# Patient Record
Sex: FEMALE | Race: BLACK OR AFRICAN AMERICAN | NOT HISPANIC OR LATINO | Employment: FULL TIME
[De-identification: names, ages, dates, MRNs, and addresses within clinical notes are randomized per-mention and may not be internally consistent; named-entity substitution may affect disease eponyms.]

---

## 2018-07-03 ENCOUNTER — HEALTH MAINTENANCE LETTER (OUTPATIENT)
Age: 28
End: 2018-07-03

## 2018-07-26 ENCOUNTER — OFFICE VISIT (OUTPATIENT)
Dept: OBGYN | Facility: CLINIC | Age: 28
End: 2018-07-26
Attending: STUDENT IN AN ORGANIZED HEALTH CARE EDUCATION/TRAINING PROGRAM
Payer: COMMERCIAL

## 2018-07-26 VITALS — WEIGHT: 107.4 LBS

## 2018-07-26 DIAGNOSIS — Z01.818 TUBAL LIGATION EVALUATION: Primary | ICD-10-CM

## 2018-07-26 RX ORDER — KETOROLAC TROMETHAMINE 30 MG/ML
30 INJECTION, SOLUTION INTRAMUSCULAR; INTRAVENOUS ONCE
Status: CANCELLED | OUTPATIENT
Start: 2018-07-26 | End: 2018-07-26

## 2018-07-26 NOTE — PROGRESS NOTES
Gyn Progress Note    S: Ms Stafford is a 27yo  who desires tubal ligation.     She has had four children in the past. Last was born in 2014. One of her children has severe autism.  She does not desire any more children.     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 Lives in [place] with her family and husbands parents.  Drinks EtOH socially. No drug use. Sexually activ ewith , using condoms.     O:  Wt 48.7 kg (107 lb 6.4 oz)  LMP 2018 (Exact Date)     Gen: NAD  CV: RRR  Resp: CTAB  Abd: soft, non-tender, non-distended  Ext: No swelling, tenderness      A/P:   Desires permanent sterilization  Discussed her desire for permanent sterilization. Discussed risk of regret, failure, and ectopic pregnancy and discussed options of nonpermanent sterilization including Paragard, Mirena, Nexplanon.  Discussed vasectomy as alternate option. She continues to desire permanent sterilization.  - Signed Federal tubal papers today  - Orders for tubal ligation, schedule 30d from today      Marcy Ivory MD  PGY-1  Ob/Gyn    I have seen and examined the patient with the resident. I have reviewed, edited, and agree with the note.     Shadia Kathleen MD

## 2018-07-26 NOTE — Clinical Note
2018       RE: Marita Stafford  [address]  [facility] [address]     Dear Colleague,    Thank you for referring your patient, Marita Stafford, to [facility] Please see a copy of my visit note below.    Gyn Progress Note    S: Ms Stafford is a 27yo  who desires tubal ligation.     She has had four children in the past. Last was born in 2014. One of her children has severe autism.  She does not desire any more children.     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 Lives in [place] with her family and husbands parents.  Drinks EtOH socially. No drug use. Sexually activ ewith , using condoms.     O:  Wt 48.7 kg (107 lb 6.4 oz)  LMP 2018 (Exact Date)     Gen: NAD  CV: RRR  Resp: CTAB  Abd: soft, non-tender, non-distended  Ext: No swelling, tenderness      A/P:   Desires permanent sterilization  Discussed her desire for permanent sterilization. Discussed risk of regret, failure, and ectopic pregnancy and discussed options of nonpermanent sterilization including Paragard, Mirena, Nexplanon.  Discussed vasectomy as alternate option. She continues to desire permanent sterilization.  - Signed Federal tubal papers today  - Orders for tubal ligation, schedule 30d from today      Marcy Ivory MD  PGY-1  Ob/Gyn        Again, thank you for allowing me to participate in the care of your patient.      Sincerely,    Marcy Ivory MD

## 2018-07-26 NOTE — LETTER
Date:August 6, 2018      Patient was self referred, no letter generated. Do not send.        [facility] Information

## 2018-07-26 NOTE — MR AVS SNAPSHOT
After Visit Summary   7/26/2018    Marita Stafford    MRN: [id number]           Patient Information     Date Of Birth          1990        Visit Information        Provider Department      7/26/2018 2:15 PM Marcy Ivory MD [facility]        Today's Diagnoses     Tubal ligation evaluation    -  1       Follow-ups after your visit        Your next 10 appointments already scheduled     Sep 14, 2018   Procedure with Surekha Hutchins MD   [facility], [place], Same Day Surgery (--)    [address]  [facility] [id number] [phone]              Who to contact     Please call your clinic at [phone] to:    Ask questions about your health    Make or cancel appointments    Discuss your medicines    Learn about your test results    Speak to your doctor            Additional Information About Your Visit        MyChart Information     [employer] gives you secure access to your electronic health record. If you see a primary care provider, you can also send messages to your care team and make appointments. If you have questions, please call your primary care clinic.  If you do not have a primary care provider, please call [phone] and they will assist you.      [employer] is an electronic gateway that provides easy, online access to your medical records. With [employer], you can request a clinic appointment, read your test results, renew a prescription or communicate with your care team.     To access your existing account, please contact your [facility] or call [phone] for assistance.        Care EveryWhere ID     This is your Care EveryWhere ID. This could be used by other organizations to access your [place] medical records  [id number]        Your Vitals Were     Last Period                   07/20/2018 (Exact Date)            Blood Pressure from Last 3 Encounters:   No data found for BP    Weight from Last 3  Encounters:   No data found for Wt              Today, you had the following     No orders found for display       Primary Care Provider Fax #    Physician No Ref-Primary [phone]       No address on file        Equal Access to Services     SHAWN CANALES : Hadii aad ku hadtru Caro, jas marshafroylan, diamond quintero, zackary mayorga laanabellaclemente lloyd. So [facility] [phone].    ATENCIÓN: Si habla español, tiene a larson disposición servicios gratuitos de asistencia lingüística. Llame al [phone].    We comply with applicable federal civil rights laws and [place] laws. We do not discriminate on the basis of race, color, national origin, age, disability, sex, sexual orientation, or gender identity.            Thank you!     Thank you for choosing [facility]  for your care. Our goal is always to provide you with excellent care. Hearing back from our patients is one way we can continue to improve our services. Please take a few minutes to complete the written survey that you may receive in the mail after your visit with us. Thank you!             Your Updated Medication List - Protect others around you: Learn how to safely use, store and throw away your medicines at www.disposemymeds.org.      Notice  As of 7/26/2018 11:59 PM    You have not been prescribed any medications.

## 2018-07-27 ENCOUNTER — TELEPHONE (OUTPATIENT)
Dept: OBGYN | Facility: CLINIC | Age: 28
End: 2018-07-27

## 2018-07-27 NOTE — TELEPHONE ENCOUNTER
Patient in clinic and received surgery packet, 9/14/18 with arrival time at 9:00a.m with nothing to eat eight hours before scheduled surgery time and clear liquids up to two hours before scheduled surgery time.     to complete the following fields:            CHECKLIST     Google Calendar : Yes     Resident notified: Not Applicable     Clinic schedule blocked:  Not Applicable    Patient notified:Yes      Pre op information sent: Yes     Given to patient over the phone.Yes    Comments:

## 2018-08-20 ENCOUNTER — TRANSFERRED RECORDS (OUTPATIENT)
Dept: HEALTH INFORMATION MANAGEMENT | Facility: CLINIC | Age: 28
End: 2018-08-20

## 2018-08-20 ENCOUNTER — OFFICE VISIT (OUTPATIENT)
Dept: FAMILY MEDICINE | Facility: OTHER | Age: 28
End: 2018-08-20
Attending: PHYSICIAN ASSISTANT
Payer: COMMERCIAL

## 2018-08-20 VITALS
BODY MASS INDEX: 21.25 KG/M2 | SYSTOLIC BLOOD PRESSURE: 88 MMHG | WEIGHT: 105.4 LBS | HEART RATE: 99 BPM | HEIGHT: 59 IN | DIASTOLIC BLOOD PRESSURE: 60 MMHG | OXYGEN SATURATION: 99 %

## 2018-08-20 DIAGNOSIS — Z30.09 BIRTH CONTROL COUNSELING: ICD-10-CM

## 2018-08-20 DIAGNOSIS — Z01.818 PRE-OP EXAM: Primary | ICD-10-CM

## 2018-08-20 DIAGNOSIS — K42.9 UMBILICAL HERNIA WITHOUT OBSTRUCTION AND WITHOUT GANGRENE: ICD-10-CM

## 2018-08-20 LAB
HCG UR QL: NEGATIVE
HGB BLD-MCNC: 12.2 G/DL (ref 11.7–15.7)

## 2018-08-20 PROCEDURE — 36415 COLL VENOUS BLD VENIPUNCTURE: CPT | Performed by: PHYSICIAN ASSISTANT

## 2018-08-20 PROCEDURE — 85018 HEMOGLOBIN: CPT | Performed by: PHYSICIAN ASSISTANT

## 2018-08-20 PROCEDURE — G0463 HOSPITAL OUTPT CLINIC VISIT: HCPCS

## 2018-08-20 PROCEDURE — 99214 OFFICE O/P EST MOD 30 MIN: CPT | Performed by: PHYSICIAN ASSISTANT

## 2018-08-20 PROCEDURE — 81025 URINE PREGNANCY TEST: CPT | Performed by: PHYSICIAN ASSISTANT

## 2018-08-20 NOTE — NURSING NOTE
"Date of Surgery: 9/14/18  Type of Surgery: Laparoscopic tubal ligation  Surgeon: Dr. Surekha Varela  Hospital:  [facility]  Fax: [phone]    Fever/Chills or other infectious symptoms in past month: NO  >10lb weight loss in past two months: NO  O2 SAT: 99    Health Care Directive/Code status:  NO  Hx of blood transfusions:   NO   Td up to date:  Yes  History of VRE/MRSA:  NO Date:    Preoperative Evaluation: Obstructive Sleep Apnea screening    S: Snore -  Do you snore loudly? (louder than talking or loud enough to be heard through closed doors) NO  T: Tired - Do you often feel tired, fatigued, or sleepy during the daytime?NO  O: Observed - Has anyone ever observed you stop breathing during your sleep?NO  P: Pressure - Do you have or are you being treated for high blood pressure?NO  B: BMI - BMI greater than 35kg/m2?NO  A: Age - Age over 50 years old?NO  N: Neck - Neck circumference greater than 40 cm?NO  G: Gender - Gender: Male?NO    Total number of \"YES\" responses:  0    Scoring: Low risk of ALBIN 0-2  At Risk of ALBIN: >3 High Risk of ALBIN: 5-8    Rufina Paredes LPN ...... 8/20/2018 3:06 PM          "

## 2018-08-20 NOTE — MR AVS SNAPSHOT
After Visit Summary   8/20/2018    Marita Stafford    MRN: [id number]           Patient Information     Date Of Birth          1990        Visit Information        Provider Department      8/20/2018 3:00 PM Agnieszka Pritchett PA-C [facility]        Today's Diagnoses     Pre-op exam    -  1      Care Instructions    Patient should take the following medications the morning of surgery with a small sip of water: hold all meds.  Patient was instructed to hold the following medications the morning of surgery: hold all meds.     Patient was advised to call our office and the surgical services with any change in condition or new symptoms if they were to develop between today and their surgical date.  Especially any cardiopulmonary symptoms or symptoms concerning for an infection.     Discontinue aspirin, aleve, naproxen and ibuprofen 7 days prior to surgery to reduce bleeding risk.  It is fine to take tylenol the week before surgery.  Hold vitamins and herbal remedies for 7 days before surgery.            Follow-ups after your visit        Follow-up notes from your care team     Return if symptoms worsen or fail to improve.      Your next 10 appointments already scheduled     Sep 14, 2018   Procedure with Surekha Hutchins MD   [facility], [place], Same Day Surgery (--)    [address] [id number] [phone]              Future tests that were ordered for you today     Open Future Orders        Priority Expected Expires Ordered    Hemoglobin Routine  8/20/2019 8/20/2018            Who to contact     If you have questions or need follow up information about today's clinic visit or your schedule please contact [facility] hospitals directly at [phone].  Normal or non-critical lab and imaging results will be communicated to you by MyChart, letter or phone within 4 business days after the clinic has received the results. If you do  "not hear from us within 7 days, please contact the clinic through [employer] or phone. If you have a critical or abnormal lab result, we will notify you by phone as soon as possible.  Submit refill requests through [employer] or call your pharmacy and they will forward the refill request to us. Please allow 3 business days for your refill to be completed.          Additional Information About Your Visit        Notorious[employer] Information     [employer] gives you secure access to your electronic health record. If you see a primary care provider, you can also send messages to your care team and make appointments. If you have questions, please call your primary care clinic.  If you do not have a primary care provider, please call [phone] and they will assist you.        Care EveryWhere ID     This is your Care EveryWhere ID. This could be used by other organizations to access your [place] medical records  [id number]        Your Vitals Were     Pulse Height Last Period Pulse Oximetry BMI (Body Mass Index)       99 4' 11\" (1.499 m) 08/19/2018 99% 21.29 kg/m2        Blood Pressure from Last 3 Encounters:   08/20/18 (!) 88/60    Weight from Last 3 Encounters:   08/20/18 105 lb 6.4 oz (47.8 kg)   07/26/18 107 lb 6.4 oz (48.7 kg)              We Performed the Following     Pregnancy, Urine (HCG)        Primary Care Provider Fax #    Physician No Ref-Primary [phone]       No address on file        Equal Access to Services     [facility]: Hadii dannie kelley hadasho Sodeannali, waaxda luqadaha, qaybta kaalmada adeshailada, zackary love . So [facility] [phone].    ATENCIÓN: Si habla español, tiene a larson disposición servicios gratuitos de asistencia lingüística. Llame al [phone].    We comply with applicable federal civil rights laws and [place] laws. We do not discriminate on the basis of race, color, national origin, age, disability, sex, sexual orientation, or gender identity.            Thank you!     " Thank you for choosing [facility]  for your care. Our goal is always to provide you with excellent care. Hearing back from our patients is one way we can continue to improve our services. Please take a few minutes to complete the written survey that you may receive in the mail after your visit with us. Thank you!             Your Updated Medication List - Protect others around you: Learn how to safely use, store and throw away your medicines at www.disposemymeds.org.      Notice  As of 8/20/2018  3:29 PM    You have not been prescribed any medications.

## 2018-08-20 NOTE — LETTER
Marita Stafford  [address] [place] [address]    8/21/2018      Dear Ms. Stafford,      We've received the results back from the laboratory for the samples you gave in clinic.  Your labs are normal. Please contact us at [phone] with any questions or concerns that you have.    I attached your lab results for your records.        Take Care,         Agnieszka Pritchett PA-C    Resulted Orders   Pregnancy, Urine (HCG)   Result Value Ref Range    HCG Qual Urine Negative NEG^Negative      Comment:      This test is for screening purposes.  Results should be interpreted along with   the clinical picture.  Confirmation testing is available if warranted by   ordering UUR821, HCG Quantitative Pregnancy.     Hemoglobin   Result Value Ref Range    Hemoglobin 12.2 11.7 - 15.7 g/dL

## 2018-08-20 NOTE — PATIENT INSTRUCTIONS
Patient should take the following medications the morning of surgery with a small sip of water: hold all meds.  Patient was instructed to hold the following medications the morning of surgery: hold all meds.     Patient was advised to call our office and the surgical services with any change in condition or new symptoms if they were to develop between today and their surgical date.  Especially any cardiopulmonary symptoms or symptoms concerning for an infection.     Discontinue aspirin, aleve, naproxen and ibuprofen 7 days prior to surgery to reduce bleeding risk.  It is fine to take tylenol the week before surgery.  Hold vitamins and herbal remedies for 7 days before surgery.

## 2018-08-20 NOTE — PROGRESS NOTES
----------------- PREOPERATIVE EXAM ------------------  8/20/2018    SUBJECTIVE:  Marita Stafford is a 28 year old female here for preoperative optimization.    I was asked to see Marita Stafford by Dr. Surekha Varela for a preoperative optimization due to general health and anesthesia risk.     Patient has a history of wanting to have a tubal ligation.  Currently has 4 children.  No interest in having children in the future.  Scheduled to have a laparoscopic tubal ligation on 9/14/2018.    Patient has history of having an umbilical hernia.  The hernia comes and goes.  Initially presented with 2nd pregnancy around 2002.  The umbilical hernia sometimes causes her discomfort and irritation.  Last caused irritation 3 weeks ago.  The hernia is sensitive when it flairs. Last flaired for 1-2 days.  No current symptoms.  It measures approximately 1-2 cm in diameter when it flares.  No current bowel or bladder concerns.  No blood in her stool or urine.    Patient is otherwise healthy.  No recent cough or cold symptoms.  No fevers, chills, headaches, nausea, vomiting, chest pain, palpitations, problems breathing, stomachaches, diarrhea, constipation, blood in stool or urine, dysuria, frequency, urgency.  No swelling of her hands or feet.    Nursing Notes:   Ryann Paredes LPN  8/20/2018  3:21 PM  Addendum  Date of Surgery: 9/14/18  Type of Surgery: Laparoscopic tubal ligation  Surgeon: Dr. Surekha Varela  Hospital[facility]  [facility]  Fax: [phone]    Fever/Chills or other infectious symptoms in past month: NO  >10lb weight loss in past two months: NO  O2 SAT: 99    Health Care Directive/Code status:  NO  Hx of blood transfusions:   NO   Td up to date:  Yes  History of VRE/MRSA:  NO Date:    Preoperative Evaluation: Obstructive Sleep Apnea screening    S: Snore -  Do you snore loudly? (louder than talking or loud enough to be heard through closed doors) NO  T: Tired - Do  "you often feel tired, fatigued, or sleepy during the daytime?NO  O: Observed - Has anyone ever observed you stop breathing during your sleep?NO  P: Pressure - Do you have or are you being treated for high blood pressure?NO  B: BMI - BMI greater than 35kg/m2?NO  A: Age - Age over 50 years old?NO  N: Neck - Neck circumference greater than 40 cm?NO  G: Gender - Gender: Male?NO    Total number of \"YES\" responses:  0    Scoring: Low risk of ALBIN 0-2  At Risk of ALBIN: >3 High Risk of ALBIN: 5-8    Rufinamaryjo Veraher SORIANO ...... 8/20/2018 3:06 PM            There are no active problems to display for this patient.      Past Medical History:   Diagnosis Date     Sinus tachycardia 02/13/2012    occurred with dehydration during pregnancy. Normal EKG since then       Past Surgical History:   Procedure Laterality Date     HC TOOTH EXTRACTION W/FORCEP         No current outpatient prescriptions on file.       Recent use of ibuprofen, aspirin or aleve: Yes - hold 7 days prior to surgery    Allergies:  No Known Allergies    Latex allergy  No    Family History   Problem Relation Age of Onset     Adopted: Yes     No Known Problems Mother      No Known Problems Father      No Known Problems Brother        Denies family hx of bleeding tendencies, anesthesia complications, or other problems with surgery.      Social History     Social History     Marital status:      Spouse name: N/A     Number of children: N/A     Years of education: N/A     Occupational History     Not on file.     Social History Main Topics     Smoking status: Former Smoker     Packs/day: 0.25     Years: 1.00     Types: Cigarettes     Start date: 1/17/2008     Quit date: 12/17/2008     Smokeless tobacco: Never Used     Alcohol use Yes      Comment: occ     Drug use: No     Sexual activity: Yes     Partners: Male     Other Topics Concern     Not on file     Social History Narrative         ROS:    surgical:  patient denies previous complications from prior surgeries " "including but not limited to prolonged bleeding, anesthesia complications, dysrhythmias, surgical wound infections, or prolonged hospital stay.      REVIEW OF SYSTEMS:  A comprehensive review of systems was negative except foritems noted in HPI/Subjective.    Constitutional: Negative for fever, chills and fatigue .   Eyes: Negative for irritation  and redness .  Ears, nose, mouth, throat, and face: Negative for ear drainage, nasal congestion, sore throat and hoarseness .  Respiratory: Negative for cough, sputum production , hemoptysis, dyspnea  and wheezing .  Cardiovascular: Negative for chest discomfort, palpitations and lightheadedness .  Gastrointestinal: Negative for dysphagia, nausea, vomiting, melena, diarrhea, constipation and abdominal pain.  Genitourinary: Negative for frequency, dysuria, urinary incontinence, hematuria.  Integument/breast: Negative for rash.   Hematologic/lymphatic: Negative for easy bruising, bleeding, lymphadenopathy and petechiae.   Musculoskeletal: Negative for myalgias and arthralgias .  Neurological: Negative for headaches, dizziness, vertigo, seizures and weakness.   Psychiatric: Negative for anxiety, depression, panic attacks and suicidal ideations.       -------------------------------------------------------------    PHYSICAL EXAM:  BP (!) 88/60 (BP Location: Right arm, Patient Position: Sitting, Cuff Size: Adult Regular)  Pulse 99  Ht 4' 11\" (1.499 m)  Wt 105 lb 6.4 oz (47.8 kg)  LMP 08/19/2018  SpO2 99%  BMI 21.29 kg/m2    EXAM:  General Appearance: Pleasant, alert, appropriate appearance for age. No acute distress  Head Exam: Normal. Normocephalic, atraumatic.  Eye Exam: Normal external eye, conjunctiva, lids, cornea. CLEO.  Ear Exam: Normal TM's bilaterally. Normal auditory canals and external ears. Non-tender.  Nose Exam: Normal external nose, mucus membranes, and septum.  OroPharynx Exam: Dental hygiene adequate. Normal buccal mucosa. Normal pharynx.  Neck Exam: " Supple, no masses or nodes., no lymphadenopathy  Thyroid Exam: Normal., No nodules or enlargement., normal to palpation  Chest/Respiratory Exam: Normal chest wall and respirations. Clear to auscultation.  Cardiovascular Exam: Regular rate and rhythm. S1, S2, no murmur, click, gallop, or rubs.  Gastrointestinal Exam: Soft, nontender, no abnormal masses or organomegaly. BS x 4.  Small umbilical hernia appreciated approximately 1 cm in diameter with Valsalva.  Lymphatic Exam: Normal.  Musculoskeletal Exam: Back is straight and non-tender, full ROM of upper and lower extremities.  Skin: no rash or abnormalities  Neurologic Exam: symmetric DTRs, normal gross motor movement, tone, and coordination. No tremor.  Psychiatric Exam: Alert and oriented, appropriate affect.    PHQ Depression Screen  No flowsheet data found.    Patient can walk up a flight of stairs without becoming short of breath or having chest pain: YES   Patient is able to tolerate greater than 4 METs of activity without any cardiopulmonary symptoms.    LABS:    Results for orders placed or performed in visit on 08/20/18   Pregnancy, Urine (HCG)   Result Value Ref Range    HCG Qual Urine Negative NEG^Negative   Hemoglobin   Result Value Ref Range    Hemoglobin 12.2 11.7 - 15.7 g/dL          CXR:  Not necessary    EKG: Not necessary    ---------------------------------------------------------------    No family history of problems with bleeding or anesthetia.    ASA PS class 2. Patient's perioperative risk is minimized and no further cardiopulmonary workup is neccesary.  Please contact the office with any questions or concerns.    1. Pre-op exam    2. Birth control counseling    3. Umbilical hernia without obstruction and without gangrene          ASSESSEMNT AND PLAN:  1.  Preoperative history and physical   consults:  None  Patient is approved for the surgery.  No concerns.     For above listed surgery and anesthesia:    - Patient is Low risk for  perioperative complications.    Patient was administered the following vaccines today: none.  Completed labs today: hemoglobin, UPT, no concerns.    Patient does have history of a small umbilical hernia the last few years.  Encouraged patient to discuss these symptoms with her surgeon to ensure that there are no concerns during the surgery.    PRE OP RECOMMENDATIONS:  Patient is on chronic pain medications no   Patient is on antiplatlet/anticoagulation no   Other medications that need adjustment perioperatively no     Patient Instructions   Patient should take the following medications the morning of surgery with a small sip of water: hold all meds.  Patient was instructed to hold the following medications the morning of surgery: hold all meds.     Patient was advised to call our office and the surgical services with any change in condition or new symptoms if they were to develop between today and their surgical date.  Especially any cardiopulmonary symptoms or symptoms concerning for an infection.     Discontinue aspirin, aleve, naproxen and ibuprofen 7 days prior to surgery to reduce bleeding risk.  It is fine to take tylenol the week before surgery.  Hold vitamins and herbal remedies for 7 days before surgery.        Other:  Patient was advised to call our office and the surgical services with any change in condition or new symptoms if they were to develop between today and their surgical date.  Especially any cardiopulmonary symptoms or symptoms concerning for an infection.     PRE OP RECOMMENDATIONS:  Discontinue ASA 7 days prior to reduce bleeding risk and Discontinue NSAIDS 7 days prior to procedure to reduce bleeding risk    Greater than 25 minutes were spent in counseling and coordination of care.    Agnieszka Pritchett PA-C..................8/20/2018 3:21 PM

## 2018-09-09 ENCOUNTER — ANESTHESIA EVENT (OUTPATIENT)
Dept: SURGERY | Facility: CLINIC | Age: 28
End: 2018-09-09
Payer: COMMERCIAL

## 2018-09-13 ENCOUNTER — TELEPHONE (OUTPATIENT)
Dept: OBGYN | Facility: CLINIC | Age: 28
End: 2018-09-13

## 2018-09-13 NOTE — TELEPHONE ENCOUNTER
Called and LVM regarding options for patient's surgery tomorrow: proceed with l/s tubal and no umbilical hernia repair (Dr. Jang not available) or if she desires hernia repair and tubal to be completed at the same time, we would reschedule her surgery for tomorrow.  Asked Marita to call back today with her decision.  MD Jyotsna

## 2018-09-14 ENCOUNTER — ANESTHESIA (OUTPATIENT)
Dept: SURGERY | Facility: CLINIC | Age: 28
End: 2018-09-14
Payer: COMMERCIAL

## 2018-09-14 ENCOUNTER — HOSPITAL ENCOUNTER (OUTPATIENT)
Facility: CLINIC | Age: 28
Discharge: HOME | End: 2018-09-14
Attending: OBSTETRICS & GYNECOLOGY | Admitting: OBSTETRICS & GYNECOLOGY
Payer: COMMERCIAL

## 2018-09-14 ENCOUNTER — SURGERY (OUTPATIENT)
Age: 28
End: 2018-09-14

## 2018-09-14 VITALS
TEMPERATURE: 98.2 F | WEIGHT: 104.72 LBS | SYSTOLIC BLOOD PRESSURE: 95 MMHG | HEIGHT: 59 IN | DIASTOLIC BLOOD PRESSURE: 62 MMHG | RESPIRATION RATE: 12 BRPM | BODY MASS INDEX: 21.11 KG/M2 | OXYGEN SATURATION: 99 %

## 2018-09-14 DIAGNOSIS — G89.18 POSTOPERATIVE PAIN: Primary | ICD-10-CM

## 2018-09-14 LAB
ABO + RH BLD: NORMAL
ABO + RH BLD: NORMAL
B-HCG SERPL-ACNC: <1 IU/L (ref 0–5)
BLD GP AB SCN SERPL QL: NORMAL
BLOOD BANK CMNT PATIENT-IMP: NORMAL
GLUCOSE SERPL-MCNC: 84 MG/DL (ref 70–99)
HGB BLD-MCNC: 12.9 G/DL (ref 11.7–15.7)
SPECIMEN EXP DATE BLD: NORMAL

## 2018-09-14 PROCEDURE — 25000132 ZZH RX MED GY IP 250 OP 250 PS 637: Performed by: STUDENT IN AN ORGANIZED HEALTH CARE EDUCATION/TRAINING PROGRAM

## 2018-09-14 PROCEDURE — 86850 RBC ANTIBODY SCREEN: CPT | Performed by: ANESTHESIOLOGY

## 2018-09-14 PROCEDURE — 71000015 ZZH RECOVERY PHASE 1 LEVEL 2 EA ADDTL HR: Performed by: OBSTETRICS & GYNECOLOGY

## 2018-09-14 PROCEDURE — 82947 ASSAY GLUCOSE BLOOD QUANT: CPT | Performed by: STUDENT IN AN ORGANIZED HEALTH CARE EDUCATION/TRAINING PROGRAM

## 2018-09-14 PROCEDURE — 86900 BLOOD TYPING SEROLOGIC ABO: CPT | Performed by: ANESTHESIOLOGY

## 2018-09-14 PROCEDURE — 25000128 H RX IP 250 OP 636: Performed by: ANESTHESIOLOGY

## 2018-09-14 PROCEDURE — 71000027 ZZH RECOVERY PHASE 2 EACH 15 MINS: Performed by: OBSTETRICS & GYNECOLOGY

## 2018-09-14 PROCEDURE — C9290 INJ, BUPIVACAINE LIPOSOME: HCPCS | Performed by: ANESTHESIOLOGY

## 2018-09-14 PROCEDURE — 36000057 ZZH SURGERY LEVEL 3 1ST 30 MIN - UMMC: Performed by: OBSTETRICS & GYNECOLOGY

## 2018-09-14 PROCEDURE — 36415 COLL VENOUS BLD VENIPUNCTURE: CPT | Performed by: STUDENT IN AN ORGANIZED HEALTH CARE EDUCATION/TRAINING PROGRAM

## 2018-09-14 PROCEDURE — 36000059 ZZH SURGERY LEVEL 3 EA 15 ADDTL MIN UMMC: Performed by: OBSTETRICS & GYNECOLOGY

## 2018-09-14 PROCEDURE — 88302 TISSUE EXAM BY PATHOLOGIST: CPT | Performed by: OBSTETRICS & GYNECOLOGY

## 2018-09-14 PROCEDURE — 71000014 ZZH RECOVERY PHASE 1 LEVEL 2 FIRST HR: Performed by: OBSTETRICS & GYNECOLOGY

## 2018-09-14 PROCEDURE — 27210794 ZZH OR GENERAL SUPPLY STERILE: Performed by: OBSTETRICS & GYNECOLOGY

## 2018-09-14 PROCEDURE — 25000128 H RX IP 250 OP 636: Performed by: STUDENT IN AN ORGANIZED HEALTH CARE EDUCATION/TRAINING PROGRAM

## 2018-09-14 PROCEDURE — 25000125 ZZHC RX 250: Performed by: ANESTHESIOLOGY

## 2018-09-14 PROCEDURE — 25000128 H RX IP 250 OP 636: Performed by: NURSE ANESTHETIST, CERTIFIED REGISTERED

## 2018-09-14 PROCEDURE — 85018 HEMOGLOBIN: CPT | Performed by: STUDENT IN AN ORGANIZED HEALTH CARE EDUCATION/TRAINING PROGRAM

## 2018-09-14 PROCEDURE — 86901 BLOOD TYPING SEROLOGIC RH(D): CPT | Performed by: ANESTHESIOLOGY

## 2018-09-14 PROCEDURE — 84702 CHORIONIC GONADOTROPIN TEST: CPT | Performed by: STUDENT IN AN ORGANIZED HEALTH CARE EDUCATION/TRAINING PROGRAM

## 2018-09-14 PROCEDURE — 25000125 ZZHC RX 250: Performed by: NURSE ANESTHETIST, CERTIFIED REGISTERED

## 2018-09-14 PROCEDURE — 37000009 ZZH ANESTHESIA TECHNICAL FEE, EACH ADDTL 15 MIN: Performed by: OBSTETRICS & GYNECOLOGY

## 2018-09-14 PROCEDURE — 37000008 ZZH ANESTHESIA TECHNICAL FEE, 1ST 30 MIN: Performed by: OBSTETRICS & GYNECOLOGY

## 2018-09-14 PROCEDURE — 25000566 ZZH SEVOFLURANE, EA 15 MIN: Performed by: OBSTETRICS & GYNECOLOGY

## 2018-09-14 PROCEDURE — 88302 TISSUE EXAM BY PATHOLOGIST: CPT | Mod: 26 | Performed by: OBSTETRICS & GYNECOLOGY

## 2018-09-14 PROCEDURE — 40000170 ZZH STATISTIC PRE-PROCEDURE ASSESSMENT II: Performed by: OBSTETRICS & GYNECOLOGY

## 2018-09-14 RX ORDER — KETOROLAC TROMETHAMINE 30 MG/ML
INJECTION, SOLUTION INTRAMUSCULAR; INTRAVENOUS PRN
Status: DISCONTINUED | OUTPATIENT
Start: 2018-09-14 | End: 2018-09-14

## 2018-09-14 RX ORDER — OXYCODONE HYDROCHLORIDE 5 MG/1
5 TABLET ORAL
Status: COMPLETED | OUTPATIENT
Start: 2018-09-14 | End: 2018-09-14

## 2018-09-14 RX ORDER — NALOXONE HYDROCHLORIDE 0.4 MG/ML
.1-.4 INJECTION, SOLUTION INTRAMUSCULAR; INTRAVENOUS; SUBCUTANEOUS
Status: DISCONTINUED | OUTPATIENT
Start: 2018-09-14 | End: 2018-09-14 | Stop reason: HOSPADM

## 2018-09-14 RX ORDER — FLUMAZENIL 0.1 MG/ML
0.2 INJECTION, SOLUTION INTRAVENOUS
Status: DISCONTINUED | OUTPATIENT
Start: 2018-09-14 | End: 2018-09-14 | Stop reason: HOSPADM

## 2018-09-14 RX ORDER — HYDROMORPHONE HYDROCHLORIDE 1 MG/ML
.3-.5 INJECTION, SOLUTION INTRAMUSCULAR; INTRAVENOUS; SUBCUTANEOUS EVERY 10 MIN PRN
Status: DISCONTINUED | OUTPATIENT
Start: 2018-09-14 | End: 2018-09-14 | Stop reason: HOSPADM

## 2018-09-14 RX ORDER — DEXAMETHASONE SODIUM PHOSPHATE 4 MG/ML
INJECTION, SOLUTION INTRA-ARTICULAR; INTRALESIONAL; INTRAMUSCULAR; INTRAVENOUS; SOFT TISSUE PRN
Status: DISCONTINUED | OUTPATIENT
Start: 2018-09-14 | End: 2018-09-14

## 2018-09-14 RX ORDER — PROPOFOL 10 MG/ML
INJECTION, EMULSION INTRAVENOUS CONTINUOUS PRN
Status: DISCONTINUED | OUTPATIENT
Start: 2018-09-14 | End: 2018-09-14

## 2018-09-14 RX ORDER — OXYCODONE AND ACETAMINOPHEN 5; 325 MG/1; MG/1
1-2 TABLET ORAL EVERY 4 HOURS PRN
Qty: 6 TABLET | Refills: 0 | Status: SHIPPED | OUTPATIENT
Start: 2018-09-14 | End: 2018-10-22

## 2018-09-14 RX ORDER — SODIUM CHLORIDE, SODIUM LACTATE, POTASSIUM CHLORIDE, CALCIUM CHLORIDE 600; 310; 30; 20 MG/100ML; MG/100ML; MG/100ML; MG/100ML
INJECTION, SOLUTION INTRAVENOUS CONTINUOUS
Status: DISCONTINUED | OUTPATIENT
Start: 2018-09-14 | End: 2018-09-14 | Stop reason: HOSPADM

## 2018-09-14 RX ORDER — ONDANSETRON 4 MG/1
4 TABLET, ORALLY DISINTEGRATING ORAL EVERY 30 MIN PRN
Status: DISCONTINUED | OUTPATIENT
Start: 2018-09-14 | End: 2018-09-14 | Stop reason: HOSPADM

## 2018-09-14 RX ORDER — FENTANYL CITRATE 50 UG/ML
25-50 INJECTION, SOLUTION INTRAMUSCULAR; INTRAVENOUS
Status: DISCONTINUED | OUTPATIENT
Start: 2018-09-14 | End: 2018-09-14 | Stop reason: HOSPADM

## 2018-09-14 RX ORDER — MEPERIDINE HYDROCHLORIDE 25 MG/ML
12.5 INJECTION INTRAMUSCULAR; INTRAVENOUS; SUBCUTANEOUS
Status: DISCONTINUED | OUTPATIENT
Start: 2018-09-14 | End: 2018-09-14 | Stop reason: HOSPADM

## 2018-09-14 RX ORDER — SODIUM CHLORIDE, SODIUM LACTATE, POTASSIUM CHLORIDE, CALCIUM CHLORIDE 600; 310; 30; 20 MG/100ML; MG/100ML; MG/100ML; MG/100ML
INJECTION, SOLUTION INTRAVENOUS CONTINUOUS PRN
Status: DISCONTINUED | OUTPATIENT
Start: 2018-09-14 | End: 2018-09-14

## 2018-09-14 RX ORDER — LIDOCAINE 40 MG/G
CREAM TOPICAL
Status: DISCONTINUED | OUTPATIENT
Start: 2018-09-14 | End: 2018-09-14 | Stop reason: HOSPADM

## 2018-09-14 RX ORDER — BUPIVACAINE HYDROCHLORIDE AND EPINEPHRINE 2.5; 5 MG/ML; UG/ML
INJECTION, SOLUTION INFILTRATION; PERINEURAL PRN
Status: DISCONTINUED | OUTPATIENT
Start: 2018-09-14 | End: 2018-09-14

## 2018-09-14 RX ORDER — PROPOFOL 10 MG/ML
INJECTION, EMULSION INTRAVENOUS PRN
Status: DISCONTINUED | OUTPATIENT
Start: 2018-09-14 | End: 2018-09-14

## 2018-09-14 RX ORDER — KETOROLAC TROMETHAMINE 30 MG/ML
30 INJECTION, SOLUTION INTRAMUSCULAR; INTRAVENOUS ONCE
Status: COMPLETED | OUTPATIENT
Start: 2018-09-14 | End: 2018-09-14

## 2018-09-14 RX ORDER — ONDANSETRON 2 MG/ML
4 INJECTION INTRAMUSCULAR; INTRAVENOUS EVERY 30 MIN PRN
Status: DISCONTINUED | OUTPATIENT
Start: 2018-09-14 | End: 2018-09-14 | Stop reason: HOSPADM

## 2018-09-14 RX ORDER — FENTANYL CITRATE 50 UG/ML
INJECTION, SOLUTION INTRAMUSCULAR; INTRAVENOUS PRN
Status: DISCONTINUED | OUTPATIENT
Start: 2018-09-14 | End: 2018-09-14

## 2018-09-14 RX ORDER — LIDOCAINE HYDROCHLORIDE 20 MG/ML
INJECTION, SOLUTION INFILTRATION; PERINEURAL PRN
Status: DISCONTINUED | OUTPATIENT
Start: 2018-09-14 | End: 2018-09-14

## 2018-09-14 RX ADMIN — HYDROMORPHONE HYDROCHLORIDE 0.5 MG: 1 INJECTION, SOLUTION INTRAMUSCULAR; INTRAVENOUS; SUBCUTANEOUS at 09:03

## 2018-09-14 RX ADMIN — BUPIVACAINE 20 ML: 13.3 INJECTION, SUSPENSION, LIPOSOMAL INFILTRATION at 07:22

## 2018-09-14 RX ADMIN — FENTANYL CITRATE 50 MCG: 50 INJECTION, SOLUTION INTRAMUSCULAR; INTRAVENOUS at 08:09

## 2018-09-14 RX ADMIN — FENTANYL CITRATE 50 MCG: 50 INJECTION, SOLUTION INTRAMUSCULAR; INTRAVENOUS at 08:54

## 2018-09-14 RX ADMIN — SUGAMMADEX 100 MG: 100 INJECTION, SOLUTION INTRAVENOUS at 09:28

## 2018-09-14 RX ADMIN — FENTANYL CITRATE 50 MCG: 50 INJECTION, SOLUTION INTRAMUSCULAR; INTRAVENOUS at 08:44

## 2018-09-14 RX ADMIN — MIDAZOLAM HYDROCHLORIDE 1 MG: 1 INJECTION, SOLUTION INTRAMUSCULAR; INTRAVENOUS at 07:18

## 2018-09-14 RX ADMIN — ONDANSETRON 4 MG: 2 INJECTION INTRAMUSCULAR; INTRAVENOUS at 10:26

## 2018-09-14 RX ADMIN — DEXAMETHASONE SODIUM PHOSPHATE 4 MG: 4 INJECTION, SOLUTION INTRAMUSCULAR; INTRAVENOUS at 08:23

## 2018-09-14 RX ADMIN — KETOROLAC TROMETHAMINE 30 MG: 30 INJECTION, SOLUTION INTRAMUSCULAR at 06:54

## 2018-09-14 RX ADMIN — BUPIVACAINE HYDROCHLORIDE AND EPINEPHRINE BITARTRATE 20 ML: 2.5; .005 INJECTION, SOLUTION INFILTRATION; PERINEURAL at 07:22

## 2018-09-14 RX ADMIN — PROPOFOL 150 MCG/KG/MIN: 10 INJECTION, EMULSION INTRAVENOUS at 08:15

## 2018-09-14 RX ADMIN — KETOROLAC TROMETHAMINE 30 MG: 30 INJECTION, SOLUTION INTRAMUSCULAR at 09:13

## 2018-09-14 RX ADMIN — SODIUM CHLORIDE, POTASSIUM CHLORIDE, SODIUM LACTATE AND CALCIUM CHLORIDE: 600; 310; 30; 20 INJECTION, SOLUTION INTRAVENOUS at 07:58

## 2018-09-14 RX ADMIN — LIDOCAINE HYDROCHLORIDE 60 MG: 20 INJECTION, SOLUTION INFILTRATION; PERINEURAL at 08:10

## 2018-09-14 RX ADMIN — FENTANYL CITRATE 50 MCG: 50 INJECTION INTRAMUSCULAR; INTRAVENOUS at 07:18

## 2018-09-14 RX ADMIN — MIDAZOLAM 1 MG: 1 INJECTION INTRAMUSCULAR; INTRAVENOUS at 07:58

## 2018-09-14 RX ADMIN — PROPOFOL 150 MG: 10 INJECTION, EMULSION INTRAVENOUS at 08:10

## 2018-09-14 RX ADMIN — ROCURONIUM BROMIDE 40 MG: 10 INJECTION INTRAVENOUS at 08:11

## 2018-09-14 RX ADMIN — OXYCODONE HYDROCHLORIDE 5 MG: 5 TABLET ORAL at 13:00

## 2018-09-14 NOTE — DISCHARGE INSTRUCTIONS
Same-Day Surgery   Adult Discharge Orders & Instructions     For 24 hours after surgery:  1. Get plenty of rest.  A responsible adult must stay with you for at least 24 hours after you leave the hospital.   2. Pain medication can slow your reflexes. Do not drive or use heavy equipment.  If you have weakness or tingling, don't drive or use heavy equipment until this feeling goes away.  3. Mixing alcohol and pain medication can cause dizziness and slow your breathing. It can even be fatal. Do not drink alcohol while taking pain medication.  4. Avoid strenuous or risky activities.  Ask for help when climbing stairs.   5. You may feel lightheaded.  If so, sit for a few minutes before standing.  Have someone help you get up.   6. If you have nausea (feel sick to your stomach), drink only clear liquids such as apple juice, ginger ale, broth or 7-Up.  Rest may also help.  Be sure to drink enough fluids.  Move to a regular diet as you feel able. Take pain medications with a small amount of solid food, such as toast or crackers, to avoid nausea.   7. A slight fever is normal. Call the doctor if your fever is over 100 F (37.7 C) (taken under the tongue) or lasts longer than 24 hours.  8. You may have a dry mouth, muscle aches, trouble sleeping or a sore throat.  These symptoms should go away after 24 hours.  9. Do not make important or legal decisions.   Pain Management:      1. Take pain medication (if prescribed) for pain as directed by your physician.        2. WARNING: If the pain medication you have been prescribed contains Tylenol  (acetaminophen), DO NOT take additional doses of Tylenol (acetaminophen).     Call your doctor for any of the followin.  Signs of infection (fever, growing tenderness at the surgery site, severe pain, a large amount of drainage or bleeding, foul-smelling drainage, redness, swelling).    2.  It has been over 8 to 10 hours since surgery and you are still not able to urinate (pee).    3.   Headache for over 24 hours.    4.  Numbness, tingling or weakness the day after surgery (if you had spinal anesthesia).  To contact a doctor, call _____________________________________ or:      [phone] and ask for the Resident On Call for:          __________________________________________ (answered 24 hours a day)      Emergency Department:  [facility]: [phone]  [place] [facility]: [phone]                 Caring for Your Incision    You ll need to care for your incision after surgery and certain medical procedures. To close an incision, your doctor used sutures (stitches), steri-strips, staples, or dermabond. Follow the tips on this sheet to help heal and prevent infection of your incision.   Types of Incision Closure:    Surgical Sutures (stitches) are placed by sewing the edges of an incision together with surgical thread. Sutures are either absorbable or non-absorbable. Absorbable sutures break down in the body over time. Non-absorbable sutures need to be removed.     Steri-strips are made of adhesive (sticky) material to help hold the edges of an incision together. Steri-strips usually fall off by themselves in 7 to 10 days.     Surgical Staples are made or steel or titanium. They are often used to close shallow incisions. They are not used on certain body areas, such as the face and hands. This is because these areas have nerves that are close to the surface. Staples are usually removed within a week.     Dermabond (skin glue) is used to close a cut or small incision. The skin glue is less painful than stitches (sutures). In some cases, a lower layer of skin may be sutured before Dermabond is applied. The skin glue closes the cut/incision within a few minutes. It also provides a water-resistant covering. No bandage is required. Dermabond peels off on its own within 5 to 10 days.  Home Care for Your  Incision:    Keep the incision clean and dry. You should bathe  only as directed by the doctor. It is okay to wash around the incision, but don t spray water directly on it.     Check the incision site daily for pain, redness, drainage, swelling, or separation of the incision edges.     If you have a dressing over the incision, change the dressing as directed by the doctor.    Make sure any clothing that touches the incision is loose fitting. This will prevent rubbing. If the incision is on the head, avoid wearing caps or other head coverings. These may rub against the incision.    Avoid rough play, contact sports, or physical activities. This can put you at risk of opening an incision.     As your incision heals, the skin may appear pink or red. It may also feel slightly bumpy or raised. This is called a healing ridge. Over time, the color should fade and the raised skin will become less noticeable.   Call the doctor right away if you have any of the following:    Increased pain, redness, drainage, swelling or bleeding at the incision site    Numbness, coldness or tingling around the incision site    Fever of 101 F degrees or higher            [facility]  Pre/Post Care Unit 3A        Marita Jacey Stafford  [address] [facility] [address]      Date: 9/14/2018      TO WHOM IT MAY CONCERN:    Marita Pattonllnusrat Rust Nydia was seen at our hospital for a procedure on 9/14/2018.  Patient may return to school or work Monday September 17th, 2018.  The patient has the following activity restrictions: No lifting over 10 pounds and no strenuous physical activity for 6 weeks.     Sincerely,      Kathy Luna RN  9/14/2018  12:52 PM       Pre/Post Care Unit

## 2018-09-14 NOTE — ANESTHESIA PROCEDURE NOTES
Peripheral Nerve Block Procedure Note    Staff:     Anesthesiologist:  BRUNO DE LA ROSA    Resident/CRNA:  JYOTHI WILSON    Block performed by resident/CRNA in the presence of a teaching physician      Referred By:  DIMITRI RAMIREZ  Location: Pre-op  Procedure Start/Stop TImes:      9/14/2018 7:20 AM     9/14/2018 7:27 AM    patient identified, IV checked, site marked, risks and benefits discussed, informed consent, monitors and equipment checked, pre-op evaluation, at physician/surgeon's request and post-op pain management      Correct Patient: Yes      Correct Position: Yes      Correct Site: Yes      Correct Procedure: Yes      Correct Laterality:  Yes    Site Marked:  Yes  Procedure details:     Procedure:  TAP (R rectus sheath, left subcostal tap)    ASA:  2    Laterality:  Bilateral    Position:  Supine    Sterile Prep: chloraprep, mask and sterile gloves      Local skin infiltration:  None    Needle:  Short bevel    Needle gauge:  21    Needle length (inches):  3.13    Ultrasound: Yes      Ultrasound used to identify targeted nerve, plexus, or vascular structure and placed a needle adjacent to it      Permanent Image entered into patiient's record      Abnormal pain on injection: No      Blood Aspirated: No      Paresthesias:  No    Bleeding at site: No      Bolus via:  Needle    Infusion Method:  Single Shot    Complications:  None  Assessment/Narrative:     Injection made incrementally with aspirations every (mL):  5

## 2018-09-14 NOTE — IP AVS SNAPSHOT
MRN:[id number]                      After Visit Summary   9/14/2018    Marita Stafford    MRN: [id number]           Thank you!     Thank you for choosing [facility] for your care. Our goal is always to provide you with excellent care. Hearing back from our patients is one way we can continue to improve our services. Please take a few minutes to complete the written survey that you may receive in the mail after you visit with us. Thank you!        Patient Information     Date Of Birth          1990        About your hospital stay     You were admitted on:  September 14, 2018 You last received care in the:   PACU    You were discharged on:  September 14, 2018       Who to Call     For medical emergencies, please call 911.  For non-urgent questions about your medical care, please call your primary care provider or clinic, [phone]  For questions related to your surgery, please call your surgery clinic        Attending Provider     Provider Specialty    Surekha Pastor MD OB/Gyn       Primary Care Provider Office Phone # Fax #    [facility] [phone]      After Care Instructions     Discharge Instructions       Resume pre procedure diet            Ice to affected area       PRN as tolerated            No alcohol       NO ALCOHOL for 24 hours post procedure            No driving or operating machinery       No driving or operating machinery until day after procedure            No lifting       No lifting over 10 pounds and no strenuous physical activity.  For 6 weeks                  Further instructions from your care team       Same-Day Surgery   Adult Discharge Orders & Instructions     For 24 hours after surgery:  1. Get plenty of rest.  A responsible adult must stay with you for at least 24 hours after you leave the hospital.   2. Pain medication can slow your reflexes. Do not drive or use heavy equipment.  If you have weakness or  tingling, don't drive or use heavy equipment until this feeling goes away.  3. Mixing alcohol and pain medication can cause dizziness and slow your breathing. It can even be fatal. Do not drink alcohol while taking pain medication.  4. Avoid strenuous or risky activities.  Ask for help when climbing stairs.   5. You may feel lightheaded.  If so, sit for a few minutes before standing.  Have someone help you get up.   6. If you have nausea (feel sick to your stomach), drink only clear liquids such as apple juice, ginger ale, broth or 7-Up.  Rest may also help.  Be sure to drink enough fluids.  Move to a regular diet as you feel able. Take pain medications with a small amount of solid food, such as toast or crackers, to avoid nausea.   7. A slight fever is normal. Call the doctor if your fever is over 100 F (37.7 C) (taken under the tongue) or lasts longer than 24 hours.  8. You may have a dry mouth, muscle aches, trouble sleeping or a sore throat.  These symptoms should go away after 24 hours.  9. Do not make important or legal decisions.   Pain Management:      1. Take pain medication (if prescribed) for pain as directed by your physician.        2. WARNING: If the pain medication you have been prescribed contains Tylenol  (acetaminophen), DO NOT take additional doses of Tylenol (acetaminophen).     Call your doctor for any of the followin.  Signs of infection (fever, growing tenderness at the surgery site, severe pain, a large amount of drainage or bleeding, foul-smelling drainage, redness, swelling).    2.  It has been over 8 to 10 hours since surgery and you are still not able to urinate (pee).    3.  Headache for over 24 hours.    4.  Numbness, tingling or weakness the day after surgery (if you had spinal anesthesia).  To contact a doctor, call _____________________________________ or:      [phone] and ask for the Resident On Call for:          __________________________________________ (answered 24  hours a day)      Emergency Department:  [place] [facility]: [phone]  [place] [facility]: [phone]                 Caring for Your Incision    You ll need to care for your incision after surgery and certain medical procedures. To close an incision, your doctor used sutures (stitches), steri-strips, staples, or dermabond. Follow the tips on this sheet to help heal and prevent infection of your incision.   Types of Incision Closure:    Surgical Sutures (stitches) are placed by sewing the edges of an incision together with surgical thread. Sutures are either absorbable or non-absorbable. Absorbable sutures break down in the body over time. Non-absorbable sutures need to be removed.     Steri-strips are made of adhesive (sticky) material to help hold the edges of an incision together. Steri-strips usually fall off by themselves in 7 to 10 days.     Surgical Staples are made or steel or titanium. They are often used to close shallow incisions. They are not used on certain body areas, such as the face and hands. This is because these areas have nerves that are close to the surface. Staples are usually removed within a week.     Dermabond (skin glue) is used to close a cut or small incision. The skin glue is less painful than stitches (sutures). In some cases, a lower layer of skin may be sutured before Dermabond is applied. The skin glue closes the cut/incision within a few minutes. It also provides a water-resistant covering. No bandage is required. Dermabond peels off on its own within 5 to 10 days.  Home Care for Your  Incision:    Keep the incision clean and dry. You should bathe only as directed by the doctor. It is okay to wash around the incision, but don t spray water directly on it.     Check the incision site daily for pain, redness, drainage, swelling, or separation of the incision edges.     If you have a dressing over the incision, change the dressing as directed by the  doctor.    Make sure any clothing that touches the incision is loose fitting. This will prevent rubbing. If the incision is on the head, avoid wearing caps or other head coverings. These may rub against the incision.    Avoid rough play, contact sports, or physical activities. This can put you at risk of opening an incision.     As your incision heals, the skin may appear pink or red. It may also feel slightly bumpy or raised. This is called a healing ridge. Over time, the color should fade and the raised skin will become less noticeable.   Call the doctor right away if you have any of the following:    Increased pain, redness, drainage, swelling or bleeding at the incision site    Numbness, coldness or tingling around the incision site    Fever of 101 F degrees or higher            [facility]  Pre/Post Care Unit 3A        Marita Rust Nydia  [address] [place] [address]      Date: 9/14/2018      TO WHOM IT MAY CONCERN:    Marita Stafford was seen at our hospital for a procedure on 9/14/2018.  Patient may return to school or work Monday September 17th, 2018.  The patient has the following activity restrictions: No lifting over 10 pounds and no strenuous physical activity for 6 weeks.     Sincerely,      Kathy Luna RN  9/14/2018  12:52 PM       Pre/Post Care Unit      Additional Information     If you use hormonal birth control (such as the pill, patch, ring or implants): You'll need a second form of birth control for 7 days (condoms, a diaphragm or contraceptive foam). While in the hospital, you received a medicine called Bridion. Your normal birth control will not work as well for a week after taking this medicine.          Pending Results     No orders found from 9/12/2018 to 9/15/2018.            Admission Information     Date & Time Provider Department Dept. Phone    9/14/2018 Surekha Pastor MD  PACU [phone]      Your Vitals  "Were     Blood Pressure Temperature Respirations Height Weight Last Period    96/58 (Cuff Size: Adult Regular) 97.6  F (36.4  C) (Oral) 12 1.499 m (4' 11\") 47.5 kg (104 lb 11.5 oz) 08/19/2018    Pulse Oximetry BMI (Body Mass Index)                100% 21.15 kg/m2          [employer] Information     [employer] gives you secure access to your electronic health record. If you see a primary care provider, you can also send messages to your care team and make appointments. If you have questions, please call your primary care clinic.  If you do not have a primary care provider, please call [phone] and they will assist you.        Care EveryWhere ID     This is your Care EveryWhere ID. This could be used by other organizations to access your [facility] medical records  [id number]        Equal Access to Services     SHAWN CANALES : Herman Caro, jas whitt, zackary carranza. So [facility] [phone].    ATENCIÓN: Si habla español, tiene a larson disposición servicios gratuitos de asistencia lingüística. Chip al [phone].    We comply with applicable federal civil rights laws and [place] laws. We do not discriminate on the basis of race, color, national origin, age, disability, sex, sexual orientation, or gender identity.               Review of your medicines      START taking        Dose / Directions    oxyCODONE-acetaminophen 5-325 MG per tablet   Commonly known as:  PERCOCET   Used for:  Postoperative pain        Dose:  1-2 tablet   Take 1-2 tablets by mouth every 4 hours as needed for pain (moderate to severe)   Quantity:  6 tablet   Refills:  0            Where to get your medicines      Some of these will need a paper prescription and others can be bought over the counter. Ask your nurse if you have questions.     Bring a paper prescription for each of these medications     oxyCODONE-acetaminophen 5-325 MG per tablet                Protect others " "around you: Learn how to safely use, store and throw away your medicines at www.disposemymeds.org.        Information about your nerve block     Today you received a block to numb the nerves near your surgery site.    This is a block using local anesthetic or \"numbing\" medication injected around the nerves to anesthetize or \"numb\" the area supplied by those nerves. This block is injected into the muscle layer near your surgical site. The type of anesthesia (Exparel) your anesthesia team used to numb your abdomen may give you relief for up to 72 hours.     Diet: There are no diet restrictions, but you should drink plenty of fluids, unless you are on a fluid-restricted diet.     Activity: If your surgical site is an arm or leg you should be careful with your affected limb, since it is possible to injure your limb without being aware of it due to the numbing. Until full feeling returns, you should guard against bumping or hitting your limb, and avoid extreme hot or cold temperatures on the skin.    Pain Medication: As the block wears off, the feeling will return as a tingling or prickly sensation near your surgical site. You will experience more discomfort from your incisions as the feeling returns. You may want to take a pain pill (a narcotic or Tylenol if this was prescribed by your surgeon) when you start to experience mild pain, before the pain becomes more severe. If your pain medications do not control your pain, you should notify your surgeon. If you are taking narcotics for pain management, do not drink alcohol, drive a car, or perform hazardous activities.  If you have questions or concerns you may call your surgeon at the number provided with your discharge instructions.     Call your surgeon if you experience blurry vision, ringing in the ears or metallic taste in your mouth.         Information about OPIOIDS     PRESCRIPTION OPIOIDS: WHAT YOU NEED TO KNOW   We gave you an opioid (narcotic) pain medicine. It " is important to manage your pain, but opioids are not always the best choice. You should first try all the other options your care team gave you. Take this medicine for as short a time (and as few doses) as possible.    Some activities can increase your pain, such as bandage changes or therapy sessions. It may help to take your pain medicine 30 to 60 minutes before these activities. Reduce your stress by getting enough sleep, working on hobbies you enjoy and practicing relaxation or meditation. Talk to your care team about ways to manage your pain beyond prescription opioids.    These medicines have risks:    DO NOT drive when on new or higher doses of pain medicine. These medicines can affect your alertness and reaction times, and you could be arrested for driving under the influence (DUI). If you need to use opioids long-term, talk to your care team about driving.    DO NOT operate heavy machinery    DO NOT do any other dangerous activities while taking these medicines.    DO NOT drink any alcohol while taking these medicines.     If the opioid prescribed includes acetaminophen, DO NOT take with any other medicines that contain acetaminophen. Read all labels carefully. Look for the word  acetaminophen  or  Tylenol.  Ask your pharmacist if you have questions or are unsure.    You can get addicted to pain medicines, especially if you have a history of addiction (chemical, alcohol or substance dependence). Talk to your care team about ways to reduce this risk.    All opioids tend to cause constipation. Drink plenty of water and eat foods that have a lot of fiber, such as fruits, vegetables, prune juice, apple juice and high-fiber cereal. Take a laxative (Miralax, milk of magnesia, Colace, Senna) if you don t move your bowels at least every other day. Other side effects include upset stomach, sleepiness, dizziness, throwing up, tolerance (needing more of the medicine to have the same effect), physical dependence and  slowed breathing.    Store your pills in a secure place, locked if possible. We will not replace any lost or stolen medicine. If you don t finish your medicine, please throw away (dispose) as directed by your pharmacist. The [place] [employer] has more information about safe disposal: https://www.pca.[facility].mn.us/living-green/managing-unwanted-medications             Medication List: This is a list of all your medications and when to take them. Check marks below indicate your daily home schedule. Keep this list as a reference.      Medications           Morning Afternoon Evening Bedtime As Needed    oxyCODONE-acetaminophen 5-325 MG per tablet   Commonly known as:  PERCOCET   Take 1-2 tablets by mouth every 4 hours as needed for pain (moderate to severe)

## 2018-09-14 NOTE — BRIEF OP NOTE
Gynecology Brief Op Note    Marita Stafford  [id number]    Date of Surgery: 9/14/2018    Surgeon: Surekha Varela MD    Assistants: Clover Clark, PGY4   Estephanie Fox, PGY1   Estephanie Collier, MS3    Pre-operative Diagnoses: Desire for permanent sterilization, umbilical hernia    Post-operative Diagnoses: Same, now s/p below stated procedure    Procedure: Laparoscopic bilateral salpingectomy    Anesthesia: General endotracheal    EBL: 10cc  IVF: 1100cc  UO: 100cc    Complications: None  Findings: EUA with small anteverted mobile uterus. Per laparoscopy, anteverted uterus noted with normal appearing bilateral fallopian tubes and ovaries. One adhesion of omentum to anterior peritoneum noted in RUQ. Laparoscopic abdominal survey revealed normal appearing omentum, liver, and bowel. ~1cm umbilical hernia sac noted, containing peritoneal fat. Ureters identified bilaterally with vermiculation.  Specimens: Bilateral fallopian tubes    Estephanie Fox MD  Ob/Gyn Resident, PGY-1  09/14/18 9:16 AM

## 2018-09-14 NOTE — ANESTHESIA CARE TRANSFER NOTE
Patient: Marita Stafford    Procedure(s):  Laparoscopic Bilateral Salpingectomy - Wound Class: I-Clean    Diagnosis: Desires Permanent Sterilization  Diagnosis Additional Information: No value filed.    Anesthesia Type:   General, ETT     Note:  Airway :Face Mask  Patient transferred to:PACU  Comments: Report to MARISSA Kenny.  Pt groggy, + eyes open and + verbal  101/62  83 NSR  RR 12 and clear, SaO2 100 %  Supine with HOB up,  Head full of concerns, otherwise comfortableHandoff Report: Identifed the Patient, Identified the Reponsible Provider, Reviewed the pertinent medical history, Discussed the surgical course, Reviewed Intra-OP anesthesia mangement and issues during anesthesia, Set expectations for post-procedure period and Allowed opportunity for questions and acknowledgement of understanding      Vitals: (Last set prior to Anesthesia Care Transfer)    CRNA VITALS  9/14/2018 0903 - 9/14/2018 0943      9/14/2018             EKG: NSR                Electronically Signed By: FRANKLIN Mazariegos CRNA  September 14, 2018  9:43 AM

## 2018-09-14 NOTE — ANESTHESIA POSTPROCEDURE EVALUATION
Patient: Marita Stafford    Procedure(s):  Laparoscopic Bilateral Salpingectomy - Wound Class: I-Clean    Diagnosis:Desires Permanent Sterilization  Diagnosis Additional Information: No value filed.    Anesthesia Type:  General, ETT    Note:  Anesthesia Post Evaluation    Patient location during evaluation: PACU  Patient participation: Able to fully participate in evaluation  Level of consciousness: sleepy but conscious  Pain management: adequate  Airway patency: patent  Cardiovascular status: acceptable  Respiratory status: acceptable  Hydration status: acceptable  PONV: none     Anesthetic complications: None    Comments:         Pt seen at the bedside.  Sleepy but arouses easily and answers appropriately.  VSS.  Tolerating po ice chips and juice.  Voicing no complaints.  Anticipate discharge to Phase 2 soon.              Dr. Linh Mckeon MD  Anesthesia  09/14/2018 @ 1108 am.        Last vitals:  Vitals:    09/14/18 1030 09/14/18 1045 09/14/18 1100   BP: 91/61 93/58 100/54   Resp: 10 12 12   Temp: 36.1  C (97  F)  36.3  C (97.3  F)   SpO2: 98% 97% 97%         Electronically Signed By: Linh Mckeon MD  September 14, 2018  11:06 AM

## 2018-09-14 NOTE — OP NOTE
[facility] Operative Note    Marita Stafford  [id number]    Date of Service: 18     Pre-operative diagnosis:  - Desire for permanent sterilization  - Umbilical hernia    Post-operative diagnosis:  - Same, now s/p below stated procedure    Procedure:   - Laparoscopic bilateral salpingectomy    Surgeon: Surekha Varela MD  Assistants: Clover Clark MD, PGY-4   Estephanie Fox MD, PGY-1   Estephanie Collier MS3    Anesthesia: General endotracheal    EBL: 10 mL  Urine: 100 mL  Fluids: 1100 mL crystalloid    Specimens: Left and right fallopian tube  Complications: None    Findings: Per laparoscopy, anteverted uterus noted with normal appearing bilateral fallopian tubes and ovaries. One adhesion of omentum to anterior peritoneum noted in mid-RUQ. Laparoscopic abdominal survey revealed normal appearing omentum, liver, and bowel. Ureters identified bilaterally with vermiculation.  Appendix not visualized.    Indications: Marita Stafford is a 28 year old year old  female who presented with desire for permanent sterilization. She was counseled on the multiple available forms of reversible contraception and she desired to proceed with tubal ligation. Discussed risks, benefits, and alternatives to the procedure including risk of infection, bleeding, damage to local organs, and increased pain and recovery time in the postoperative period. The patient's questions were answered, understanding confirmed, and the patient signed written informed consent. Federal tubal papers were signed 18.    Technique: The patient was taken to the operating room where general endotracheal anesthesia was administered.  She was prepped and draped in usual sterile manner in dorsal lithotomy position with arms tucked. A time out was performed. SCDs were in place for VTE prophylaxis.    EUA was performed. A speculum was inserted into the vagina. A single-toothed tenaculum was placed on the anterior lip of the  cervix and an acorn uterine manipulator was placed. Speculum was removed and a hayden catheter inserted into the bladder.    Attention was then turned to the abdomen. An 11-blade scalpel was used to make a 5 mm vertical incision just inferior to the umbilicus. A Veress needle was placed with saline noted to flow freely through the attached syringe. The CO2 gas was attached with an opening pressure of 1 mm Hg.  The abdomen was insufflated to a pressure of 15 mmHg. The Veress needle was removed and a 5 mm port was placed in the umbilical incision. Abdominal survey revealed atraumatic entry and the findings above.     Additional 5 mm ports were placed in the right lower quadrant and the left lower quadrant under direct visualization. The patient was placed in Trendelenburg. The bowel was swept out of the pelvis. The right fallopian tube was elevated and starting from the distal end, mesosalpinx was then sequentially clamped, ligated, and cut using the Ligasure. The tube was then excised from the uterus and the entire tube was removed through the 5mm port. The process was repeated on the left fallopian tube. The surgical sites were inspected and noted to be hemostatic. Bilateral ureters were identified at the end of the case and peristalsis noted.  The abdomen was desufflated and the ports were removed under direct visualization.     The skin incisions were closed with 4-0 Monocryl and Dermabond. All instruments removed from the vagina and cervix.    Instrument, needle, and sponge counts were correct times two. Dr. Varela was present and scrubbed for the entire case. The patient was transferred to the PACU in stable condition.    Estephanie Fox MD  Ob/Gyn Resident, PGY-1  Pager: [phone]  09/14/18 9:16 AM     I participated in all aspects of Marita Stafford's case with Dr. Fox on 9/14/2018 and agree with the operative details in this note with edits by me.     Surekha Varela MD MPH

## 2018-09-14 NOTE — IP AVS SNAPSHOT
[facility]    [address] [facility] [address]    Phone:  [phone]                                       After Visit Summary   9/14/2018    Marita Stafford    MRN: [id number]           After Visit Summary Signature Page     I have received my discharge instructions, and my questions have been answered. I have discussed any challenges I see with this plan with the nurse or doctor.    ..........................................................................................................................................  Patient/Patient Representative Signature      ..........................................................................................................................................  Patient Representative Print Name and Relationship to Patient    ..................................................               ................................................  Date                                   Time    ..........................................................................................................................................  Reviewed by Signature/Title    ...................................................              ..............................................  Date                                               Time          22EPIC Rev 08/18

## 2018-09-14 NOTE — ANESTHESIA PREPROCEDURE EVALUATION
Anesthesia Evaluation     . Pt has had prior anesthetic.            ROS/MED HX    ENT/Pulmonary:  - neg pulmonary ROS     Neurologic:  - neg neurologic ROS     Cardiovascular:  - neg cardiovascular ROS   (+) ----. : . . . :. . Previous cardiac testing date:results:date: results:ECG reviewed date:03/20/2013 results:NSR @ 97 bpm date: results:          METS/Exercise Tolerance:     Hematologic:  - neg hematologic  ROS       Musculoskeletal:  - neg musculoskeletal ROS       GI/Hepatic:  - neg GI/hepatic ROS       Renal/Genitourinary:  - ROS Renal section negative       Endo:  - neg endo ROS       Psychiatric:  - neg psychiatric ROS       Infectious Disease:  - neg infectious disease ROS       Malignancy:      - no malignancy   Other:                     Physical Exam  Normal systems: cardiovascular, pulmonary and dental    Airway   Mallampati: III  TM distance: >3 FB  Neck ROM: full    Dental     Cardiovascular       Pulmonary                     Anesthesia Plan      History & Physical Review      ASA Status:  1 .    NPO Status:  > 6 hours    Plan for General and ETT with Propofol induction. Maintenance will be Balanced.    PONV prophylaxis:  Ondansetron (or other 5HT-3) and Dexamethasone or Solumedrol       Postoperative Care  Postoperative pain management:  IV analgesics.      Consents  Anesthetic plan, risks, benefits and alternatives discussed with:  Patient..                          .            Labs (today)                       Hg                12.9                   glc                94                   HCG             Negative                       Type and Screen  -  pending      Plan   -  GETA. (CMAC assisted).  Regional blockade for postoperative pain control.        Risks, benefits and possible complications of GETA discussed in full with the patient. She voices understanding and wishes to proceed.        Dr. Linh Mckeon MD  Anesthesia  09/14/2018 @ 0703 am

## 2018-09-19 LAB — COPATH REPORT: NORMAL

## 2018-10-22 ENCOUNTER — OFFICE VISIT (OUTPATIENT)
Dept: OBGYN | Facility: OTHER | Age: 28
End: 2018-10-22
Attending: OBSTETRICS & GYNECOLOGY
Payer: COMMERCIAL

## 2018-10-22 VITALS
DIASTOLIC BLOOD PRESSURE: 60 MMHG | HEART RATE: 72 BPM | BODY MASS INDEX: 21.2 KG/M2 | HEIGHT: 59 IN | SYSTOLIC BLOOD PRESSURE: 98 MMHG | TEMPERATURE: 98.1 F | WEIGHT: 105.19 LBS

## 2018-10-22 DIAGNOSIS — Z98.890 POSTOPERATIVE STATE: ICD-10-CM

## 2018-10-22 DIAGNOSIS — Z23 NEED FOR PROPHYLACTIC VACCINATION AND INOCULATION AGAINST INFLUENZA: ICD-10-CM

## 2018-10-22 DIAGNOSIS — Z23 FLU VACCINE NEED: Primary | ICD-10-CM

## 2018-10-22 DIAGNOSIS — Z90.79 HISTORY OF BILATERAL SALPINGECTOMY: ICD-10-CM

## 2018-10-22 PROCEDURE — 90686 IIV4 VACC NO PRSV 0.5 ML IM: CPT | Performed by: OBSTETRICS & GYNECOLOGY

## 2018-10-22 PROCEDURE — G0463 HOSPITAL OUTPT CLINIC VISIT: HCPCS | Mod: 25

## 2018-10-22 PROCEDURE — 99202 OFFICE O/P NEW SF 15 MIN: CPT | Performed by: OBSTETRICS & GYNECOLOGY

## 2018-10-22 PROCEDURE — 90471 IMMUNIZATION ADMIN: CPT

## 2018-10-22 PROCEDURE — G0463 HOSPITAL OUTPT CLINIC VISIT: HCPCS

## 2018-10-22 PROCEDURE — G0008 ADMIN INFLUENZA VIRUS VAC: HCPCS

## 2018-10-22 ASSESSMENT — PAIN SCALES - GENERAL: PAINLEVEL: NO PAIN (0)

## 2018-10-22 NOTE — LETTER
[facility]  [address]  [place] [address]          October 22, 2018    RE:  Marita Stafford                                                                                                                                                       [address]  [place] [address]    To whom it may concern:    Maritachaitanya Mari Yocasta Stafford is under my professional care 10/22/2018  And may resume work without restrictions.      Sincerely,      Flo Nguyen MD FACOG  11:18 AM 10/22/2018

## 2018-10-22 NOTE — PROGRESS NOTES

## 2018-10-22 NOTE — NURSING NOTE
Patient presents today as a six week postop following a tubal ligation done at the [facility]. She needs a work ability form to return to work.  Traci Bo LPN  10/22/2018  10:59 AM

## 2018-10-22 NOTE — MR AVS SNAPSHOT
After Visit Summary   10/22/2018    Marita Stafford    MRN: [id number]           Patient Information     Date Of Birth          1990        Visit Information        Provider Department      10/22/2018 11:00 AM Flo Nguyen MD         Today's Diagnoses     Postoperative state    -  1    History of bilateral salpingectomy           Follow-ups after your visit        Follow-up notes from your care team     Return in about 1 year (around 10/22/2019), or if symptoms worsen or fail to improve.      Your next 10 appointments already scheduled     Oct 26, 2018 11:00 AM CDT   Office Visit with Agnieszka Pritchett PA-C    ()    [address]  [facility] [address] [phone]           Bring a current list of meds and any records pertaining to this visit. For Physicals, please bring immunization records and any forms needing to be filled out. Please arrive 10 minutes early to complete paperwork.              Who to contact     If you have questions or need follow up information about today's clinic visit or your schedule please contact [facility] directly at [phone].  Normal or non-critical lab and imaging results will be communicated to you by MyChart, letter or phone within 4 business days after the clinic has received the results. If you do not hear from us within 7 days, please contact the clinic through Fantastechart or phone. If you have a critical or abnormal lab result, we will notify you by phone as soon as possible.  Submit refill requests through Stamplay or call your pharmacy and they will forward the refill request to us. Please allow 3 business days for your refill to be completed.          Additional Information About Your Visit        Fantastechart Information     Stamplay gives you secure access to your electronic health record. If you see a primary care  "provider, you can also send messages to your care team and make appointments. If you have questions, please call your primary care clinic.  If you do not have a primary care provider, please call [phone] and they will assist you.        Care EveryWhere ID     This is your Care EveryWhere ID. This could be used by other organizations to access your [facility] medical records  [id number]        Your Vitals Were     Pulse Temperature Height Last Period Breastfeeding? BMI (Body Mass Index)    72 98.1  F (36.7  C) (Tympanic) 1.499 m (4' 11\") 10/11/2018 (Approximate) Yes 21.25 kg/m2       Blood Pressure from Last 3 Encounters:   10/22/18 98/60   09/14/18 95/62   08/20/18 (!) 88/60    Weight from Last 3 Encounters:   10/22/18 47.7 kg (105 lb 3 oz)   09/14/18 47.5 kg (104 lb 11.5 oz)   08/20/18 47.8 kg (105 lb 6.4 oz)              Today, you had the following     No orders found for display       Primary Care Provider Office Phone # Fax #    [facility] [phone]       [address] [facility] [address]        Equal Access to Services     SHAWN CANALES AH: Hadii dannie maddeno Sodeannali, waaxda luqadaha, qaybta kaalmada adeegyada, zackary lloyd. So [facility] [phone].    ATENCIÓN: Si habla español, tiene a larson disposición servicios gratuitos de asistencia lingüística. Llame al [phone].    We comply with applicable federal civil rights laws and [place] laws. We do not discriminate on the basis of race, color, national origin, age, disability, sex, sexual orientation, or gender identity.            Thank you!     Thank you for choosing [facility]  for your care. Our goal is always to provide you with excellent care. Hearing back from our patients is one way we can continue to improve our services. Please take a few minutes to complete the written survey that you may receive in the mail after your visit with us. Thank you!           "   Your Updated Medication List - Protect others around you: Learn how to safely use, store and throw away your medicines at www.disposemymeds.org.      Notice  As of 10/22/2018 11:27 AM    You have not been prescribed any medications.

## 2018-10-22 NOTE — PROGRESS NOTES
"Marita Jacey Yocasta tSafford presents todayfor a postop checkup at 4 weeks after laparoscopic salpingectomy performed at [facility] in [place]    S: Bowels and bladder are functioning normally, no abnormal bleeding or pain. No concerns about the incision(s).    No current outpatient prescriptions on file.     No current facility-administered medications for this visit.      No Known Allergies  Past Medical History:   Diagnosis Date     Sinus tachycardia 02/13/2012    occurred with dehydration during pregnancy. Normal EKG since then     Past Surgical History:   Procedure Laterality Date     HC TOOTH EXTRACTION W/FORCEP       LAPAROSCOPIC SALPINGECTOMY Bilateral 9/14/2018    Procedure: LAPAROSCOPIC SALPINGECTOMY;  Laparoscopic Bilateral Salpingectomy;  Surgeon: Surekha Pastor MD;  Location: [facility] OR       COMPLETE REVIEW OF SYSTEMS: Neg except as above        O: BP 98/60 (BP Location: Right arm, Patient Position: Sitting, Cuff Size: Adult Regular)  Pulse 72  Temp 98.1  F (36.7  C) (Tympanic)  Ht 1.499 m (4' 11\")  Wt 47.7 kg (105 lb 3 oz)  LMP 10/11/2018 (Approximate)  Breastfeeding? Yes  BMI 21.25 kg/m2 Body mass index is 21.25 kg/(m^2).    EXAM:  NAD  Abdomen: soft, NT, ND  Incisions CDI      I/P: Postop check after tubal ligation.    Resume normal activities and maintenance cares.    Flo Nguyen MD FACOG  11:15 AM 10/22/2018       "

## 2018-10-23 ASSESSMENT — PATIENT HEALTH QUESTIONNAIRE - PHQ9: SUM OF ALL RESPONSES TO PHQ QUESTIONS 1-9: 16

## 2018-10-26 ENCOUNTER — OFFICE VISIT (OUTPATIENT)
Dept: FAMILY MEDICINE | Facility: OTHER | Age: 28
End: 2018-10-26
Attending: PHYSICIAN ASSISTANT
Payer: COMMERCIAL

## 2018-10-26 VITALS
BODY MASS INDEX: 21.61 KG/M2 | TEMPERATURE: 97.9 F | RESPIRATION RATE: 18 BRPM | HEART RATE: 84 BPM | WEIGHT: 107 LBS | DIASTOLIC BLOOD PRESSURE: 60 MMHG | SYSTOLIC BLOOD PRESSURE: 88 MMHG

## 2018-10-26 DIAGNOSIS — F41.9 MODERATE ANXIETY: Primary | ICD-10-CM

## 2018-10-26 DIAGNOSIS — F32.2 SEVERE DEPRESSION (H): ICD-10-CM

## 2018-10-26 DIAGNOSIS — Z23 NEED FOR TDAP VACCINATION: ICD-10-CM

## 2018-10-26 PROCEDURE — 99214 OFFICE O/P EST MOD 30 MIN: CPT | Performed by: PHYSICIAN ASSISTANT

## 2018-10-26 PROCEDURE — G0463 HOSPITAL OUTPT CLINIC VISIT: HCPCS

## 2018-10-26 PROCEDURE — G0463 HOSPITAL OUTPT CLINIC VISIT: HCPCS | Mod: 25

## 2018-10-26 PROCEDURE — 90471 IMMUNIZATION ADMIN: CPT

## 2018-10-26 PROCEDURE — 90715 TDAP VACCINE 7 YRS/> IM: CPT | Performed by: PHYSICIAN ASSISTANT

## 2018-10-26 ASSESSMENT — PATIENT HEALTH QUESTIONNAIRE - PHQ9
5. POOR APPETITE OR OVEREATING: MORE THAN HALF THE DAYS
SUM OF ALL RESPONSES TO PHQ QUESTIONS 1-9: 18

## 2018-10-26 ASSESSMENT — ANXIETY QUESTIONNAIRES
5. BEING SO RESTLESS THAT IT IS HARD TO SIT STILL: MORE THAN HALF THE DAYS
IF YOU CHECKED OFF ANY PROBLEMS ON THIS QUESTIONNAIRE, HOW DIFFICULT HAVE THESE PROBLEMS MADE IT FOR YOU TO DO YOUR WORK, TAKE CARE OF THINGS AT HOME, OR GET ALONG WITH OTHER PEOPLE: SOMEWHAT DIFFICULT
2. NOT BEING ABLE TO STOP OR CONTROL WORRYING: MORE THAN HALF THE DAYS
7. FEELING AFRAID AS IF SOMETHING AWFUL MIGHT HAPPEN: SEVERAL DAYS
6. BECOMING EASILY ANNOYED OR IRRITABLE: SEVERAL DAYS
GAD7 TOTAL SCORE: 14
1. FEELING NERVOUS, ANXIOUS, OR ON EDGE: NEARLY EVERY DAY
3. WORRYING TOO MUCH ABOUT DIFFERENT THINGS: NEARLY EVERY DAY

## 2018-10-26 NOTE — PATIENT INSTRUCTIONS
Started on sertraline 50 mg.  Encouraged good diet and exercise.   Encouraged to see a counselor.   Return in 4-6 weeks for recheck.   Return to clinic with change/worsening of symptoms.       Suicide Emergency First call for help:  [phone]    Depression: Tips to Help Yourself  As your healthcare providers help treat your depression, you can also help yourself. Keep in mind that your illness affects you emotionally, physically, mentally, and socially. So full recovery will take time. Take care of your body and your soul, and be patient with yourself as you get better.    Self-care    Educate yourself. Read about treatment and medicine options. If you have the energy, attend local conferences or support groups. Keep a list of useful websites and helpful books and use them as needed. This illness is not your fault. Don t blame yourself for your depression.    Manage early symptoms. If you notice symptoms returning, experience triggers, or identify other factors that may lead to a depressive episode, get help as soon as possible. Ask trusted friends and family to monitor your behavior and let you know if they see anything of concern.    Work with your provider. Find a provider you can trust. Communicate honestly with that person and share information on your treatment for depression and your reaction to medicines.    Be prepared for a crisis. Know what to do if you experience a crisis. Keep the phone number of a crisis hotline and know the location of your community's urgent care centers and the closest emergency department.    Hold off on big decisions. Depression can cloud your judgment. So wait until you feel better before making major life decisions, such as changing jobs, moving, or getting  or .    Be patient. Recovering from depression is a process. Don t be discouraged if it takes some time to feel better.    Keep it simple. Depression saps your energy and concentration. So  you won t be able to do all the things you used to do. Set small goals and do what you can.    Be with others. Don t isolate yourself you ll only feel worse. Try to be with other people. And take part in fun activities when you can. Go to a movie, ballgame, Zoroastrian service, or social event. Talk openly with people you can trust. And accept help when it s offered.  Take care of your body  People with depression often lose the desire to take care of themselves. That only makes their problems worse. During treatment and afterward, make a point to:    Exercise. It s a great way to take care of your body. And studies have shown that exercise helps fight depression.    Avoid drugs and alcohol. These may ease the pain in the short term. But they ll only make your problems worse in the long run.    Get relief from stress. Ask your healthcare provider for relaxation exercises and techniques to help relieve stress.    Eat right. A balanced and healthy diet helps keep your body healthy.  Date Last Reviewed: 1/1/2017 2000-2017 [employer]. [address] [place] [address]. All rights reserved. This information is not intended as a substitute for professional medical care. Always follow your healthcare professional's instructions.         Treating Anxiety Disorders with Therapy    If you have an anxiety disorder, you don t have to suffer anymore. Treatment is available. Therapy (also called counseling) is often a helpful treatment for anxiety disorders. With therapy, a specially trained professional (therapist) helps you face and learn to manage your anxiety. Therapy can be short-term or long-term depending on your needs. In some cases, medicine may also be prescribed with therapy. It may take time before you notice how much therapy is helping, but stick with it. With therapy, you can feel better.  Cognitive behavioral therapy (CBT)  Cognitive behavioral therapy (CBT) teaches you to manage anxiety. It  does this by helping you understand how you think and act when you re anxious. Research has shown CBT to be a very effective treatment for anxiety disorders. How CBT is run is almost like a class. It involves homework and activities to build skills that teach you to cope with anxiety step by step. It can be done in a group or one-on-one, and often takes place for a set number of sessions. CBT has two main parts:    Cognitive therapy helps you identify the negative, irrational thoughts that occur with your anxiety. You ll learn to replace these with more positive, realistic thoughts.    Behavioral therapy helps you change how you react to anxiety. You ll learn coping skills and methods for relaxing to help you better deal with anxiety.  Other forms of therapy  Other therapy methods may work better for you than CBT. Or, you may move from CBT to another form of therapy as your treatment needs change. This may mean meeting with a therapist by yourself or in a group. Therapy can also help you work through problems in your life, such as drug or alcohol dependence, that may be making your anxiety worse.  Getting better takes time  Therapy will help you feel better and teach you skills to help manage anxiety long term. But change doesn t happen right away. It takes a commitment from you. And treatment only works if you learn to face the causes of your anxiety. So, you might feel worse before you feel better. This can sometimes make it hard to stick with it. But remember: Therapy is a very effective treatment. The results will be well worth it.  Helping yourself  If anxiety is wearing you down, here are some things you can do to cope:    Check with your doctor and rule out any physical problems that may be causing the anxiety symptoms.    If an anxiety disorder is diagnosed, seek mental healthcare. This is an illness and it can respond to treatment. Most types of anxiety disorders will respond to talk therapy and medicine.     Educate yourself about anxiety disorders. Keep track of helpful online resources and books you can use during stressful periods.    Try stress management techniques such as meditation.    Consider online or in-person support groups.    Don t fight your feelings. Anxiety feeds itself. The more you worry about it, the worse it gets. Instead, try to identify what might have triggered your anxiety. Then try to put this threat in perspective.    Keep in mind that you can t control everything about a situation. Change what you can and let the rest take its course.    Exercise   it s a great way to relieve tension and help your body feel relaxed.    Examine your life for stress, and try to find ways to reduce it.    Avoid caffeine and nicotine, which can make anxiety symptoms worse.    Fight the temptation to turn to alcohol or unprescribed drugs for relief. They only make things worse in the long run.   Date Last Reviewed: 1/1/2017 2000-2017 [employer]. [address] [place] [address], [place] [address]. All rights reserved. This information is not intended as a substitute for professional medical care. Always follow your healthcare professional's instructions.

## 2018-10-26 NOTE — MR AVS SNAPSHOT
After Visit Summary   10/26/2018    Marita Stafford    MRN: [id number]           Patient Information     Date Of Birth          1990        Visit Information        Provider Department      10/26/2018 11:00 AM Agnieszka Pritchett PA-C [facility]        Today's Diagnoses     Moderate anxiety    -  1    Severe depression (H)          Care Instructions    Started on sertraline 50 mg.  Encouraged good diet and exercise.   Encouraged to see a counselor.   Return in 4-6 weeks for recheck.   Return to clinic with change/worsening of symptoms.       Suicide Emergency First call for help:  [phone]    Depression: Tips to Help Yourself  As your healthcare providers help treat your depression, you can also help yourself. Keep in mind that your illness affects you emotionally, physically, mentally, and socially. So full recovery will take time. Take care of your body and your soul, and be patient with yourself as you get better.    Self-care    Educate yourself. Read about treatment and medicine options. If you have the energy, attend local conferences or support groups. Keep a list of useful websites and helpful books and use them as needed. This illness is not your fault. Don t blame yourself for your depression.    Manage early symptoms. If you notice symptoms returning, experience triggers, or identify other factors that may lead to a depressive episode, get help as soon as possible. Ask trusted friends and family to monitor your behavior and let you know if they see anything of concern.    Work with your provider. Find a provider you can trust. Communicate honestly with that person and share information on your treatment for depression and your reaction to medicines.    Be prepared for a crisis. Know what to do if you experience a crisis. Keep the phone number of a crisis hotline and know the location of your community's urgent care centers and the  [facility] emergency department.    Hold off on big decisions. Depression can cloud your judgment. So wait until you feel better before making major life decisions, such as changing jobs, moving, or getting  or .    Be patient. Recovering from depression is a process. Don t be discouraged if it takes some time to feel better.    Keep it simple. Depression saps your energy and concentration. So you won t be able to do all the things you used to do. Set small goals and do what you can.    Be with others. Don t isolate yourself--you ll only feel worse. Try to be with other people. And take part in fun activities when you can. Go to a movie, [employer], Ho[employer] service, or social event. Talk openly with people you can trust. And accept help when it s offered.  Take care of your body  People with depression often lose the desire to take care of themselves. That only makes their problems worse. During treatment and afterward, make a point to:    Exercise. It s a great way to take care of your body. And studies have shown that exercise helps fight depression.    Avoid drugs and alcohol. These may ease the pain in the short term. But they ll only make your problems worse in the long run.    Get relief from stress. Ask your healthcare provider for relaxation exercises and techniques to help relieve stress.    Eat right. A balanced and healthy diet helps keep your body healthy.  Date Last Reviewed: 1/1/2017 2000-2017 [employer]. [address] [place] [address]. All rights reserved. This information is not intended as a substitute for professional medical care. Always follow your healthcare professional's instructions.         Treating Anxiety Disorders with Therapy    If you have an anxiety disorder, you don t have to suffer anymore. Treatment is available. Therapy (also called counseling) is often a helpful treatment for anxiety disorders. With therapy, a specially trained professional  (therapist) helps you face and learn to manage your anxiety. Therapy can be short-term or long-term depending on your needs. In some cases, medicine may also be prescribed with therapy. It may take time before you notice how much therapy is helping, but stick with it. With therapy, you can feel better.  Cognitive behavioral therapy (CBT)  Cognitive behavioral therapy (CBT) teaches you to manage anxiety. It does this by helping you understand how you think and act when you re anxious. Research has shown CBT to be a very effective treatment for anxiety disorders. How CBT is run is almost like a class. It involves homework and activities to build skills that teach you to cope with anxiety step by step. It can be done in a group or one-on-one, and often takes place for a set number of sessions. CBT has two main parts:    Cognitive therapy helps you identify the negative, irrational thoughts that occur with your anxiety. You ll learn to replace these with more positive, realistic thoughts.    Behavioral therapy helps you change how you react to anxiety. You ll learn coping skills and methods for relaxing to help you better deal with anxiety.  Other forms of therapy  Other therapy methods may work better for you than CBT. Or, you may move from CBT to another form of therapy as your treatment needs change. This may mean meeting with a therapist by yourself or in a group. Therapy can also help you work through problems in your life, such as drug or alcohol dependence, that may be making your anxiety worse.  Getting better takes time  Therapy will help you feel better and teach you skills to help manage anxiety long term. But change doesn t happen right away. It takes a commitment from you. And treatment only works if you learn to face the causes of your anxiety. So, you might feel worse before you feel better. This can sometimes make it hard to stick with it. But remember: Therapy is a very effective treatment. The results  will be well worth it.  Helping yourself  If anxiety is wearing you down, here are some things you can do to cope:    Check with your doctor and rule out any physical problems that may be causing the anxiety symptoms.    If an anxiety disorder is diagnosed, seek mental healthcare. This is an illness and it can respond to treatment. Most types of anxiety disorders will respond to talk therapy and medicine.    Educate yourself about anxiety disorders. Keep track of helpful online resources and books you can use during stressful periods.    Try stress management techniques such as meditation.    Consider online or in-person support groups.    Don t fight your feelings. Anxiety feeds itself. The more you worry about it, the worse it gets. Instead, try to identify what might have triggered your anxiety. Then try to put this threat in perspective.    Keep in mind that you can t control everything about a situation. Change what you can and let the rest take its course.    Exercise -- it s a great way to relieve tension and help your body feel relaxed.    Examine your life for stress, and try to find ways to reduce it.    Avoid caffeine and nicotine, which can make anxiety symptoms worse.    Fight the temptation to turn to alcohol or unprescribed drugs for relief. They only make things worse in the long run.   Date Last Reviewed: 1/1/2017 2000-2017 [employer]. [address] [place] [address], [place] [address]. All rights reserved. This information is not intended as a substitute for professional medical care. Always follow your healthcare professional's instructions.                  Follow-ups after your visit        Follow-up notes from your care team     Return if symptoms worsen or fail to improve.      Who to contact     If you have questions or need follow up information about today's clinic visit or your schedule please contact [facility] directly at [phone].  Normal or  non-critical lab and imaging results will be communicated to you by Rostimahart, letter or phone within 4 business days after the clinic has received the results. If you do not hear from us within 7 days, please contact the clinic through SPark! or phone. If you have a critical or abnormal lab result, we will notify you by phone as soon as possible.  Submit refill requests through SPark! or call your pharmacy and they will forward the refill request to us. Please allow 3 business days for your refill to be completed.          Additional Information About Your Visit        SPark! Information     SPark! gives you secure access to your electronic health record. If you see a primary care provider, you can also send messages to your care team and make appointments. If you have questions, please call your primary care clinic.  If you do not have a primary care provider, please call [phone] and they will assist you.        Care EveryWhere ID     This is your Care EveryWhere ID. This could be used by other organizations to access your [place] [facility] medical records  [id number]        Your Vitals Were     Pulse Temperature Respirations Last Period BMI (Body Mass Index)       84 97.9  F (36.6  C) 18 10/11/2018 (Approximate) 21.61 kg/m2        Blood Pressure from Last 3 Encounters:   10/26/18 (!) 88/60   10/22/18 98/60   09/14/18 95/62    Weight from Last 3 Encounters:   10/26/18 107 lb (48.5 kg)   10/22/18 105 lb 3 oz (47.7 kg)   09/14/18 104 lb 11.5 oz (47.5 kg)              Today, you had the following     No orders found for display         Today's Medication Changes          These changes are accurate as of 10/26/18 11:32 AM.  If you have any questions, ask your nurse or doctor.               Start taking these medicines.        Dose/Directions    sertraline 50 MG tablet   Commonly known as:  ZOLOFT   Used for:  Severe depression (H), Moderate anxiety   Started by:  Agnieszka Pritchett PA-C        Dose:  50 mg   Take 1  tablet (50 mg) by mouth daily   Quantity:  30 tablet   Refills:  1            Where to get your medicines      These medications were sent to [id number] - [place], [place] - [address], [facility] [address]     Phone:  [phone]     sertraline 50 MG tablet                Primary Care Provider Office Phone # Fax #    [facility] [phone]       [address]  [facility] [address]        Equal Access to Services     [facility] : Hadii dannie kelley hadasho Soomaali, waaxda luqadaha, qaybta kaalmada adeegyada, zackary love . So [facility] [phone].    ATENCIÓN: Si habla español, tiene a larson disposición servicios gratuitos de asistencia lingüística. Llame al [phone].    We comply with applicable federal civil rights laws and [place] laws. We do not discriminate on the basis of race, color, national origin, age, disability, sex, sexual orientation, or gender identity.            Thank you!     Thank you for choosing [facility]  for your care. Our goal is always to provide you with excellent care. Hearing back from our patients is one way we can continue to improve our services. Please take a few minutes to complete the written survey that you may receive in the mail after your visit with us. Thank you!             Your Updated Medication List - Protect others around you: Learn how to safely use, store and throw away your medicines at www.disposemymeds.org.          This list is accurate as of 10/26/18 11:32 AM.  Always use your most recent med list.                   Brand Name Dispense Instructions for use Diagnosis    sertraline 50 MG tablet    ZOLOFT    30 tablet    Take 1 tablet (50 mg) by mouth daily    Severe depression (H), Moderate anxiety

## 2018-10-26 NOTE — NURSING NOTE
Patient presents to clinic to discuss anxiety/depression.  Rufina Paredes LPN ...... 10/26/2018 11:08 AM

## 2018-10-26 NOTE — PROGRESS NOTES
Nursing Notes:   Ryann Paredes LPN  10/26/2018 11:20 AM  Signed  Patient presents to clinic to discuss anxiety/depression.  Rufina Reggie SORIANO ...... 10/26/2018 11:08 AM          HPI:    Marita Stafford is a 28 year old female who presents for anxiety and depression. Been pressent for many years. Daughter has autism. Marriage issues. Working. Going to school.  She has had a lot of stress recently.  Decrease appetite. Anxiety been present for a long time.  Patient feels anxious.  She is having more anxiety attacks. More depressed a couple months.  No suicidal or homicidal ideation.  No weight changes recently.  She has a hard time controlling worrying.  Worries a lot.    Patient needs a tetanus vaccination.    Past Medical History:   Diagnosis Date     Sinus tachycardia 02/13/2012    occurred with dehydration during pregnancy. Normal EKG since then       Past Surgical History:   Procedure Laterality Date     HC TOOTH EXTRACTION W/FORCEP       LAPAROSCOPIC SALPINGECTOMY Bilateral 9/14/2018    Procedure: LAPAROSCOPIC SALPINGECTOMY;  Laparoscopic Bilateral Salpingectomy;  Surgeon: Surekha Pastor MD;  Location:  OR       Family History   Problem Relation Age of Onset     Adopted: Yes     No Known Problems Mother      No Known Problems Father      No Known Problems Brother        Social History     Social History     Marital status:      Spouse name: N/A     Number of children: N/A     Years of education: N/A     Occupational History     Not on file.     Social History Main Topics     Smoking status: Current Some Day Smoker     Packs/day: 0.25     Years: 1.00     Types: Cigarettes     Start date: 1/17/2008     Last attempt to quit: 12/17/2008     Smokeless tobacco: Never Used     Alcohol use Yes      Comment: occ     Drug use: No     Sexual activity: Yes     Partners: Male     Birth control/ protection: Female Surgical     Other Topics Concern     Not on file     Social  History Narrative       Current Outpatient Prescriptions   Medication Sig Dispense Refill     sertraline (ZOLOFT) 50 MG tablet Take 1 tablet (50 mg) by mouth daily 30 tablet 1       No Known Allergies    REVIEW OFSYSTEMS:  Refer to HPI.    EXAM:   Vitals:    BP (!) 88/60 (BP Location: Right arm, Patient Position: Sitting, Cuff Size: Adult Regular)  Pulse 84  Temp 97.9  F (36.6  C)  Resp 18  Wt 107 lb (48.5 kg)  LMP 10/11/2018 (Approximate)  BMI 21.61 kg/m2  General appearance:appropriately dressed and well groomed  Attitude: cooperative  Behavior: normal  Eye Contact: normal  Speech: normal  Orientation: oriented to person, place, time and situation  Mood:  admits moderate sadness and anxiety  Affect: Mood Congruent  Thought Process: clear  Suicidal or Homicidal Ideation: reports no thoughts or intentions  Hallucination: no    PHQ Depression Screen  PHQ-9 SCORE 10/22/2018 10/26/2018   Total Score 16 18       FERCHO Anxiety Screen  FERCHO-7 SCORE 10/26/2018   Total Score 14       ASSESSMENT AND PLAN:    1. Moderate anxiety    2. Severe depression (H)    3. Need for Tdap vaccination        Patient was given a tetanus vaccination.    Anxiety depression:  Started on sertraline 50 mg.  Encouraged good diet and exercise.   Encouraged to see a counselor.   Return in 4-6 weeks for recheck.   Return to clinic with change/worsening of symptoms.     Greater than 25 minutes were spent in counseling and coordination of care.     Patient Instructions     Started on sertraline 50 mg.  Encouraged good diet and exercise.   Encouraged to see a counselor.   Return in 4-6 weeks for recheck.   Return to clinic with change/worsening of symptoms.       Suicide Emergency First call for help:  [phone]    Depression: Tips to Help Yourself  As your healthcare providers help treat your depression, you can also help yourself. Keep in mind that your illness affects you emotionally, physically, mentally, and socially. So full  recovery will take time. Take care of your body and your soul, and be patient with yourself as you get better.    Self-care    Educate yourself. Read about treatment and medicine options. If you have the energy, attend local conferences or support groups. Keep a list of useful websites and helpful books and use them as needed. This illness is not your fault. Don t blame yourself for your depression.    Manage early symptoms. If you notice symptoms returning, experience triggers, or identify other factors that may lead to a depressive episode, get help as soon as possible. Ask trusted friends and family to monitor your behavior and let you know if they see anything of concern.    Work with your provider. Find a provider you can trust. Communicate honestly with that person and share information on your treatment for depression and your reaction to medicines.    Be prepared for a crisis. Know what to do if you experience a crisis. Keep the phone number of a crisis hotline and know the location of your community's urgent care centers and the closest emergency department.    Hold off on big decisions. Depression can cloud your judgment. So wait until you feel better before making major life decisions, such as changing jobs, moving, or getting  or .    Be patient. Recovering from depression is a process. Don t be discouraged if it takes some time to feel better.    Keep it simple. Depression saps your energy and concentration. So you won t be able to do all the things you used to do. Set small goals and do what you can.    Be with others. Don t isolate yourself--you ll only feel worse. Try to be with other people. And take part in fun activities when you can. Go to a movie, ballgame, [employer] service, or social event. Talk openly with people you can trust. And accept help when it s offered.  Take care of your body  People with depression often lose the desire to take care of themselves. That only makes  their problems worse. During treatment and afterward, make a point to:    Exercise. It s a great way to take care of your body. And studies have shown that exercise helps fight depression.    Avoid drugs and alcohol. These may ease the pain in the short term. But they ll only make your problems worse in the long run.    Get relief from stress. Ask your healthcare provider for relaxation exercises and techniques to help relieve stress.    Eat right. A balanced and healthy diet helps keep your body healthy.  Date Last Reviewed: 1/1/2017 2000-2017 [employer]. [address] [place] [address]. All rights reserved. This information is not intended as a substitute for professional medical care. Always follow your healthcare professional's instructions.         Treating Anxiety Disorders with Therapy    If you have an anxiety disorder, you don t have to suffer anymore. Treatment is available. Therapy (also called counseling) is often a helpful treatment for anxiety disorders. With therapy, a specially trained professional (therapist) helps you face and learn to manage your anxiety. Therapy can be short-term or long-term depending on your needs. In some cases, medicine may also be prescribed with therapy. It may take time before you notice how much therapy is helping, but stick with it. With therapy, you can feel better.  Cognitive behavioral therapy (CBT)  Cognitive behavioral therapy (CBT) teaches you to manage anxiety. It does this by helping you understand how you think and act when you re anxious. Research has shown CBT to be a very effective treatment for anxiety disorders. How CBT is run is almost like a class. It involves homework and activities to build skills that teach you to cope with anxiety step by step. It can be done in a group or one-on-one, and often takes place for a set number of sessions. CBT has two main parts:    Cognitive therapy helps you identify the negative, irrational  thoughts that occur with your anxiety. You ll learn to replace these with more positive, realistic thoughts.    Behavioral therapy helps you change how you react to anxiety. You ll learn coping skills and methods for relaxing to help you better deal with anxiety.  Other forms of therapy  Other therapy methods may work better for you than CBT. Or, you may move from CBT to another form of therapy as your treatment needs change. This may mean meeting with a therapist by yourself or in a group. Therapy can also help you work through problems in your life, such as drug or alcohol dependence, that may be making your anxiety worse.  Getting better takes time  Therapy will help you feel better and teach you skills to help manage anxiety long term. But change doesn t happen right away. It takes a commitment from you. And treatment only works if you learn to face the causes of your anxiety. So, you might feel worse before you feel better. This can sometimes make it hard to stick with it. But remember: Therapy is a very effective treatment. The results will be well worth it.  Helping yourself  If anxiety is wearing you down, here are some things you can do to cope:    Check with your doctor and rule out any physical problems that may be causing the anxiety symptoms.    If an anxiety disorder is diagnosed, seek mental healthcare. This is an illness and it can respond to treatment. Most types of anxiety disorders will respond to talk therapy and medicine.    Educate yourself about anxiety disorders. Keep track of helpful online resources and books you can use during stressful periods.    Try stress management techniques such as meditation.    Consider online or in-person support groups.    Don t fight your feelings. Anxiety feeds itself. The more you worry about it, the worse it gets. Instead, try to identify what might have triggered your anxiety. Then try to put this threat in perspective.    Keep in mind that you can t  control everything about a situation. Change what you can and let the rest take its course.    Exercise -- it s a great way to relieve tension and help your body feel relaxed.    Examine your life for stress, and try to find ways to reduce it.    Avoid caffeine and nicotine, which can make anxiety symptoms worse.    Fight the temptation to turn to alcohol or unprescribed drugs for relief. They only make things worse in the long run.   Date Last Reviewed: 1/1/2017 2000-2017 [employer]. [address] [place] [address], [place] [address]. All rights reserved. This information is not intended as a substitute for professional medical care. Always follow your healthcare professional's instructions.             Reviewed risks and benefits of medications and other treatment options.   Pt is advised to call if side effects to medications occur, especially if exaggerated/dramatic.    Agnieszka Pritchett PA-C..................10/26/2018 11:20 AM

## 2018-10-27 ASSESSMENT — ANXIETY QUESTIONNAIRES: GAD7 TOTAL SCORE: 14

## 2019-08-05 ENCOUNTER — ALLIED HEALTH/NURSE VISIT (OUTPATIENT)
Dept: FAMILY MEDICINE | Facility: OTHER | Age: 29
End: 2019-08-05
Payer: COMMERCIAL

## 2019-08-05 DIAGNOSIS — Z11.1 SCREENING EXAMINATION FOR PULMONARY TUBERCULOSIS: Primary | ICD-10-CM

## 2019-08-05 PROCEDURE — 86580 TB INTRADERMAL TEST: CPT

## 2019-08-05 NOTE — PROGRESS NOTES
The patient is asked the following questions today and these are her answers:    -Have you had a mantoux administered in the past 30 days?    No  -Have you had a previous positive Mantoux.  No  -Have you received BCG in the past.  No  -Have you had a live vaccine  (MMR, Varicella, OPV, Yellow Fever) in the last 6 weeks.  No  -Have you had and active  viral or bacterial infection in the past 6 weeks.  No  -Have you received corticosteroids or immunosuppressive agents in the past 6 weeks.  No  -Have you been diagnosed with HIV?  No  -Do you have a malignancy?  No    Pt will return here to have it read in 48 to 72 hours. Tatiana Morataya RN on 8/5/2019 at 2:25 PM

## 2019-08-07 ENCOUNTER — ALLIED HEALTH/NURSE VISIT (OUTPATIENT)
Dept: FAMILY MEDICINE | Facility: OTHER | Age: 29
End: 2019-08-07
Payer: COMMERCIAL

## 2019-08-07 DIAGNOSIS — Z11.1 TUBERCULIN SKIN TEST READING ENCOUNTER: Primary | ICD-10-CM

## 2019-08-07 LAB
PPDINDURATION: 0 MM (ref 0–5)
PPDREDNESS: 0 MM

## 2019-08-07 NOTE — PROGRESS NOTES
Verified name and date of birth and Mantoux read and given written copy for her records for school . Pt left ambulatory. Tatiana Morataya RN on 8/7/2019 at 2:47 PM

## 2019-08-20 ENCOUNTER — ALLIED HEALTH/NURSE VISIT (OUTPATIENT)
Dept: FAMILY MEDICINE | Facility: OTHER | Age: 29
End: 2019-08-20
Payer: COMMERCIAL

## 2019-08-20 DIAGNOSIS — Z11.1 SCREENING EXAMINATION FOR PULMONARY TUBERCULOSIS: Primary | ICD-10-CM

## 2019-08-20 PROCEDURE — 86580 TB INTRADERMAL TEST: CPT

## 2019-08-20 NOTE — PROGRESS NOTES
The patient is asked the following questions today and these are her answers:  Name and date of birth verified   Pt will return in 48 to 72 hours to have read   -Have you had a mantoux administered in the past 30 days?    yes  -Have you had a previous positive Mantoux.  No  -Have you received BCG in the past.  No  -Have you had a live vaccine  (MMR, Varicella, OPV, Yellow Fever) in the last 6 weeks.  No  -Have you had and active  viral or bacterial infection in the past 6 weeks.  No  -Have you received corticosteroids or immunosuppressive agents in the past 6 weeks.  No  -Have you been diagnosed with HIV?  No  -Do you have a malignancy?  No   Tatiana Morataya RN on 8/20/2019 at 2:47 PM

## 2019-08-22 ENCOUNTER — ALLIED HEALTH/NURSE VISIT (OUTPATIENT)
Dept: FAMILY MEDICINE | Facility: OTHER | Age: 29
End: 2019-08-22
Payer: COMMERCIAL

## 2019-08-22 DIAGNOSIS — Z11.1 TUBERCULIN SKIN TEST READING ENCOUNTER: Primary | ICD-10-CM

## 2019-08-22 LAB
PPDINDURATION: 0 MM (ref 0–5)
PPDREDNESS: 0 MM

## 2019-08-22 PROCEDURE — 99207 ZZC NO CHARGE NURSE ONLY: CPT

## 2019-08-22 NOTE — PROGRESS NOTES
Patient name and date fo birth verified and Mantoux read as negative and paper copy given to her take with her today. Pt left ambulatory. Tatiana Morataya RN on 8/22/2019 at 2:46 PM

## 2020-01-23 ENCOUNTER — OFFICE VISIT (OUTPATIENT)
Dept: FAMILY MEDICINE | Facility: OTHER | Age: 30
End: 2020-01-23
Attending: PHYSICIAN ASSISTANT
Payer: COMMERCIAL

## 2020-01-23 VITALS
WEIGHT: 111.8 LBS | HEART RATE: 84 BPM | DIASTOLIC BLOOD PRESSURE: 64 MMHG | BODY MASS INDEX: 22.58 KG/M2 | SYSTOLIC BLOOD PRESSURE: 100 MMHG | RESPIRATION RATE: 20 BRPM | TEMPERATURE: 97 F

## 2020-01-23 DIAGNOSIS — L21.9 SEBORRHEIC DERMATITIS OF SCALP: ICD-10-CM

## 2020-01-23 DIAGNOSIS — L30.9 ECZEMA, UNSPECIFIED TYPE: Primary | ICD-10-CM

## 2020-01-23 PROCEDURE — 99213 OFFICE O/P EST LOW 20 MIN: CPT | Performed by: PHYSICIAN ASSISTANT

## 2020-01-23 PROCEDURE — G0463 HOSPITAL OUTPT CLINIC VISIT: HCPCS

## 2020-01-23 RX ORDER — TRIAMCINOLONE ACETONIDE 1 MG/G
OINTMENT TOPICAL 2 TIMES DAILY
Qty: 80 G | Refills: 3 | Status: SHIPPED | OUTPATIENT
Start: 2020-01-23 | End: 2022-07-27

## 2020-01-23 RX ORDER — KETOCONAZOLE 20 MG/ML
SHAMPOO TOPICAL
Qty: 120 ML | Refills: 3 | Status: SHIPPED | OUTPATIENT
Start: 2020-01-23 | End: 2022-07-27

## 2020-01-23 ASSESSMENT — PATIENT HEALTH QUESTIONNAIRE - PHQ9: SUM OF ALL RESPONSES TO PHQ QUESTIONS 1-9: 8

## 2020-01-23 NOTE — PATIENT INSTRUCTIONS
Started on triamcinolone ointment and ketoconazole shampoo.     Aquaphor or eucerin cream.     Encouraged use of benadryl 50 mg up to 4 times daily and claritin 10 mg daily as needed. Return to clinic with change/worsening of symptoms.     Referred to dermatology.       Patient Education     Managing Atopic Dermatitis (Eczema)     After bathing, gently pat your skin dry (don t rub). Apply moisturizer while your skin is still damp.   To manage your symptoms and help reduce the severity and frequency, try these self-care tips:  Caring for your skin    Use a gentle, fragrance-free cleanser (or nonsoap cleanser) for bathing. Rinse well. Pat skin dry.    Take warm, not hot, baths or showers. Try to limit them to no more that 10 to 15 minutes.     Use moisturizer liberally right after you bathe, while your skin is still damp.    Avoid scratching because it will cause more damage to your skin.     Topical, over-the-counter hydrocortisone cream may help control mild symptoms.   Controlling your environment    Avoid extreme heat or cold.    Avoid very humid or very dry air.    If your home or office air is very dry, use a humidifier.    Avoid allergens, such as dust, that may be present in bedding, carpets, plush toys, or rugs.    Know that pet hair and dander can cause flare-ups.  Seeking medical treatment  Another way to keep symptoms under control is to seek medical treatment. Talk with your healthcare provider about the type of treatment that may work best for you. Your provider may prescribe treatments such as the following:    Topical treatments to put on the skin daily    Medicines taken by mouth (oral medicines), such as antihistamines, antibiotics, or corticosteroids    In severe cases shots (injections) may be needed to control the symptoms. You may even need antibiotics if skin infections occur.  Treatments don t work the same way for every person. So if your symptoms continue or get worse, ask your healthcare  provider about other treatments.  Making lifestyle choices    Manage the stress in your life.    Wear loose-fitting cotton clothing that does not bind or rub your skin.    Avoid contact with wool or other scratchy fabrics.    Use fragrance-free products.  Getting good results  Now that you know more about atopic dermatitis, the next step is up to you. Follow your healthcare provider s treatment plan and your self-care routine. This will help bring atopic dermatitis under control. If your symptoms persist, be sure to let your health care provider know.   Date Last Reviewed: 2/1/2017 2000-2019 [employer]. [address] [place] [id number]. All rights reserved. This information is not intended as a substitute for professional medical care. Always follow your healthcare professional's instructions.           Patient Education     Understanding Seborrheic Dermatitis    Seborrheic dermatitis is a common type of rash that causes red, scaly, greasy skin. It occurs on skin that has oil glands. These include the face, upper chest, and scalp, where it is often called dandruff. It tends to last a long time, or go away and come back. Seborrheic dermatitis is not spread from person to person.   How to say it  [id number]  What causes seborrheic dermatitis?  Experts don't know what causes it. It may be partly caused by a type of yeast that grows on skin, along with extra oil production. Experts are still learning more. It s more common in men than women, and it can occur at any age. It happens more often in people with HIV/AIDS, Parkinson disease, alcoholic pancreatitis, hepatitis, or cancer. It can also get worse during times of stress.  Symptoms of seborrheic dermatitis  Symptoms can include skin that is:    Bumpy    Covered with yellow scales or crusts    Cracked    Greasy    Itchy    Leaking fluid    Painful    Red or orange  These symptoms can occur on skin:    Around the  nose    Behind the ears    In the beard    In the eyebrows    On the scalp, also known as dandruff    On the upper chest  You may also have acne, inflamed eyelids (blepharitis), or other skin conditions at the same time.  Treatment for seborrheic dermatitis  Treatment can reduce symptoms for a period of time. The types of treatments most often used include:    Antifungal shampoo, body wash, or cream. These contain medicines such as ketoconazole, fluconazole, selenium sulfide, ciclopirox, pyrithione zinc, or tea tree oil.    Corticosteroid cream or ointment. These contain medicines such as hydrocortisone.    Calcineurin inhibitor cream or ointment. These contain medicines such as pimecrolimus or tacrolimus.    Shampoo or cream with other medicines. These contain medicines such as coal tar, salicylic acid, or zinc pyrithione.    Sodium sulfacetemide creams and washes. These may also help.  In some cases one treatment will stop working after a while. Switching between treatments every few months may be helpful.   Wash your skin gently. You can remove scales with oil and gentle rubbing or a brush.  Living with seborrheic dermatitis  Seborrheic dermatitis is an ongoing (chronic) condition. It can go away and then come back. You will likely need to use shampoo, cream, or ointment with medicine once or twice a week. This can help to keep symptoms from coming back or getting worse.  When to call your healthcare provider  Call your healthcare provider right away if you have any of these:    Symptoms that don t get better, or get worse    New symptoms  Date Last Reviewed: 5/1/2016 2000-2019 [employer]. [address] [place] [address], [place] [address]. All rights reserved. This information is not intended as a substitute for professional medical care. Always follow your healthcare professional's instructions.

## 2020-01-23 NOTE — PROGRESS NOTES
Nursing Notes:   Ryann Paredes LPN  2020 11:42 AM  Signed  Chief Complaint   Patient presents with     Eczema         Medication Reconciliation: complete    Ryann Paredes LPN      HPI:    Marita Stafford is a 29 year old female who presents for skin concerns.    Patient has history of eczema since teenager. Increased over last few weeks. On hands, wrists, neck, scalp and back. Very itchy. Using her daughters steroid cream. washing hands all the time at work. Using lotion which helps mildly. History of prescription cream in the past, none now. Flares with stress.  Eczema was gone in the summer.  She also has eczema in her scalp.  Worse on the lower back side.  No open wound, pus, drainage, warmth, fevers, chills.    Past Medical History:   Diagnosis Date     Sinus tachycardia 2012    occurred with dehydration during pregnancy. Normal EKG since then       Past Surgical History:   Procedure Laterality Date     HC TOOTH EXTRACTION W/FORCEP       LAPAROSCOPIC SALPINGECTOMY Bilateral 2018    Procedure: LAPAROSCOPIC SALPINGECTOMY;  Laparoscopic Bilateral Salpingectomy;  Surgeon: Surekha Pastor MD;  Location: [facility] OR       Family History   Adopted: Yes   Problem Relation Age of Onset     No Known Problems Mother      No Known Problems Father      No Known Problems Brother        Social History     Tobacco Use     Smoking status: Current Some Day Smoker     Packs/day: 0.25     Years: 1.00     Pack years: 0.25     Types: Cigarettes     Start date: 2008     Last attempt to quit: 2008     Years since quittin.1     Smokeless tobacco: Never Used   Substance Use Topics     Alcohol use: Yes     Comment: occ       Current Outpatient Medications   Medication Sig Dispense Refill     ketoconazole (NIZORAL) 2 % external shampoo Apply 5 to 10 mL to wet scalp, lather, leave on 3 to 5 minutes, and rinse; apply twice weekly for 4 weeks and then decrease to as  needed 120 mL 3     triamcinolone (KENALOG) 0.1 % external ointment Apply topically 2 times daily 80 g 3       No Known Allergies    REVIEW OF SYSTEMS:  Refer to HPI.    EXAM:   Vitals:    /64 (BP Location: Right arm, Patient Position: Sitting, Cuff Size: Adult Regular)   Pulse 84   Temp 97  F (36.1  C)   Resp 20   Wt 50.7 kg (111 lb 12.8 oz)   BMI 22.58 kg/m      General Appearance: Pleasant, alert, appropriate appearance for age. No acute distress  Head Exam: Normal. Normocephalic, atraumatic.  Chest/Respiratory Exam: Normal chest wall and respirations. Clear to auscultation.  Cardiovascular Exam: Regular rate and rhythm. S1, S2, no murmur, click, gallop, or rubs.  Skin: Dry scaly skin appreciated scattered throughout the patient's hands patient also has several patches appreciated scattered throughout her occipital region.  Psychiatric Exam: Alert and oriented - appropriate affect.    PHQ Depression Screen  PHQ-9 SCORE 10/22/2018 10/26/2018 1/23/2020   PHQ-9 Total Score 16 18 8       ASSESSMENT AND PLAN:      ICD-10-CM    1. Eczema, unspecified type L30.9 triamcinolone (KENALOG) 0.1 % external ointment     DERMATOLOGY REFERRAL   2. Seborrheic dermatitis of scalp L21.9 DERMATOLOGY REFERRAL     ketoconazole (NIZORAL) 2 % external shampoo     Eczema:  Started on triamcinolone ointment and ketoconazole shampoo.  Gave patient education.    Encouraged to use Aquaphor or eucerin cream.     Itching: Encouraged use of benadryl 50 mg up to 4 times daily and claritin 10 mg daily as needed. Return to clinic with change/worsening of symptoms.     Referred to dermatology for consult.       Patient Instructions     Started on triamcinolone ointment and ketoconazole shampoo.     Aquaphor or eucerin cream.     Encouraged use of benadryl 50 mg up to 4 times daily and claritin 10 mg daily as needed. Return to clinic with change/worsening of symptoms.     Referred to dermatology.       Patient Education     Managing  Atopic Dermatitis (Eczema)     After bathing, gently pat your skin dry (don t rub). Apply moisturizer while your skin is still damp.   To manage your symptoms and help reduce the severity and frequency, try these self-care tips:  Caring for your skin    Use a gentle, fragrance-free cleanser (or nonsoap cleanser) for bathing. Rinse well. Pat skin dry.    Take warm, not hot, baths or showers. Try to limit them to no more that 10 to 15 minutes.     Use moisturizer liberally right after you bathe, while your skin is still damp.    Avoid scratching because it will cause more damage to your skin.     Topical, over-the-counter hydrocortisone cream may help control mild symptoms.   Controlling your environment    Avoid extreme heat or cold.    Avoid very humid or very dry air.    If your home or office air is very dry, use a humidifier.    Avoid allergens, such as dust, that may be present in bedding, carpets, plush toys, or rugs.    Know that pet hair and dander can cause flare-ups.  Seeking medical treatment  Another way to keep symptoms under control is to seek medical treatment. Talk with your healthcare provider about the type of treatment that may work best for you. Your provider may prescribe treatments such as the following:    Topical treatments to put on the skin daily    Medicines taken by mouth (oral medicines), such as antihistamines, antibiotics, or corticosteroids    In severe cases shots (injections) may be needed to control the symptoms. You may even need antibiotics if skin infections occur.  Treatments don t work the same way for every person. So if your symptoms continue or get worse, ask your healthcare provider about other treatments.  Making lifestyle choices    Manage the stress in your life.    Wear loose-fitting cotton clothing that does not bind or rub your skin.    Avoid contact with wool or other scratchy fabrics.    Use fragrance-free products.  Getting good results  Now that you know more  about atopic dermatitis, the next step is up to you. Follow your healthcare provider s treatment plan and your self-care routine. This will help bring atopic dermatitis under control. If your symptoms persist, be sure to let your health care provider know.   Date Last Reviewed: 2/1/2017 2000-2019 [employer]. [address] [place] [address], [place] [address]. All rights reserved. This information is not intended as a substitute for professional medical care. Always follow your healthcare professional's instructions.           Patient Education     Understanding Seborrheic Dermatitis    Seborrheic dermatitis is a common type of rash that causes red, scaly, greasy skin. It occurs on skin that has oil glands. These include the face, upper chest, and scalp, where it is often called dandruff. It tends to last a long time, or go away and come back. Seborrheic dermatitis is not spread from person to person.   How to say it  [id number]  What causes seborrheic dermatitis?  Experts don't know what causes it. It may be partly caused by a type of yeast that grows on skin, along with extra oil production. Experts are still learning more. It s more common in men than women, and it can occur at any age. It happens more often in people with HIV/AIDS, Parkinson disease, alcoholic pancreatitis, hepatitis, or cancer. It can also get worse during times of stress.  Symptoms of seborrheic dermatitis  Symptoms can include skin that is:    Bumpy    Covered with yellow scales or crusts    Cracked    Greasy    Itchy    Leaking fluid    Painful    Red or orange  These symptoms can occur on skin:    Around the nose    Behind the ears    In the beard    In the eyebrows    On the scalp, also known as dandruff    On the upper chest  You may also have acne, inflamed eyelids (blepharitis), or other skin conditions at the same time.  Treatment for seborrheic dermatitis  Treatment can reduce symptoms for a period of time.  The types of treatments most often used include:    Antifungal shampoo, body wash, or cream. These contain medicines such as ketoconazole, fluconazole, selenium sulfide, ciclopirox, pyrithione zinc, or tea tree oil.    Corticosteroid cream or ointment. These contain medicines such as hydrocortisone.    Calcineurin inhibitor cream or ointment. These contain medicines such as pimecrolimus or tacrolimus.    Shampoo or cream with other medicines. These contain medicines such as coal tar, salicylic acid, or zinc pyrithione.    Sodium sulfacetemide creams and washes. These may also help.  In some cases one treatment will stop working after a while. Switching between treatments every few months may be helpful.   Wash your skin gently. You can remove scales with oil and gentle rubbing or a brush.  Living with seborrheic dermatitis  Seborrheic dermatitis is an ongoing (chronic) condition. It can go away and then come back. You will likely need to use shampoo, cream, or ointment with medicine once or twice a week. This can help to keep symptoms from coming back or getting worse.  When to call your healthcare provider  Call your healthcare provider right away if you have any of these:    Symptoms that don t get better, or get worse    New symptoms  Date Last Reviewed: 5/1/2016 2000-2019 [employer]. [address] [place] [address], [place] [address]. All rights reserved. This information is not intended as a substitute for professional medical care. Always follow your healthcare professional's instructions.                Agnieszka Pritchett PA-C PA-C..................1/23/2020 11:46 AM

## 2020-01-23 NOTE — NURSING NOTE
Chief Complaint   Patient presents with     Eczema         Medication Reconciliation: complete    Ryann Paredes, LPN

## 2020-03-11 ENCOUNTER — HEALTH MAINTENANCE LETTER (OUTPATIENT)
Age: 30
End: 2020-03-11

## 2021-01-03 ENCOUNTER — HEALTH MAINTENANCE LETTER (OUTPATIENT)
Age: 31
End: 2021-01-03

## 2021-04-25 ENCOUNTER — HEALTH MAINTENANCE LETTER (OUTPATIENT)
Age: 31
End: 2021-04-25

## 2021-10-09 ENCOUNTER — HEALTH MAINTENANCE LETTER (OUTPATIENT)
Age: 31
End: 2021-10-09

## 2022-05-21 ENCOUNTER — HEALTH MAINTENANCE LETTER (OUTPATIENT)
Age: 32
End: 2022-05-21

## 2022-07-27 ENCOUNTER — OFFICE VISIT (OUTPATIENT)
Dept: FAMILY MEDICINE | Facility: OTHER | Age: 32
End: 2022-07-27
Attending: FAMILY MEDICINE
Payer: COMMERCIAL

## 2022-07-27 VITALS
TEMPERATURE: 97.8 F | DIASTOLIC BLOOD PRESSURE: 66 MMHG | RESPIRATION RATE: 16 BRPM | WEIGHT: 112.8 LBS | HEART RATE: 102 BPM | BODY MASS INDEX: 23.68 KG/M2 | SYSTOLIC BLOOD PRESSURE: 122 MMHG | OXYGEN SATURATION: 100 % | HEIGHT: 58 IN

## 2022-07-27 DIAGNOSIS — L30.9 ECZEMA, UNSPECIFIED TYPE: Primary | ICD-10-CM

## 2022-07-27 DIAGNOSIS — L21.9 SEBORRHEIC DERMATITIS OF SCALP: ICD-10-CM

## 2022-07-27 PROCEDURE — 96372 THER/PROPH/DIAG INJ SC/IM: CPT | Performed by: FAMILY MEDICINE

## 2022-07-27 PROCEDURE — G0463 HOSPITAL OUTPT CLINIC VISIT: HCPCS

## 2022-07-27 PROCEDURE — 250N000011 HC RX IP 250 OP 636: Performed by: FAMILY MEDICINE

## 2022-07-27 PROCEDURE — 99213 OFFICE O/P EST LOW 20 MIN: CPT | Performed by: FAMILY MEDICINE

## 2022-07-27 PROCEDURE — G0463 HOSPITAL OUTPT CLINIC VISIT: HCPCS | Mod: 25

## 2022-07-27 RX ORDER — KETOCONAZOLE 20 MG/ML
SHAMPOO TOPICAL
Qty: 120 ML | Refills: 3 | Status: SHIPPED | OUTPATIENT
Start: 2022-07-27 | End: 2022-10-11

## 2022-07-27 RX ORDER — TRIAMCINOLONE ACETONIDE 1 MG/G
OINTMENT TOPICAL 2 TIMES DAILY
Qty: 80 G | Refills: 3 | Status: SHIPPED | OUTPATIENT
Start: 2022-07-27 | End: 2023-02-06

## 2022-07-27 RX ORDER — METHYLPREDNISOLONE 4 MG
TABLET, DOSE PACK ORAL
Qty: 21 TABLET | Refills: 0 | Status: SHIPPED | OUTPATIENT
Start: 2022-07-27 | End: 2023-02-06

## 2022-07-27 RX ORDER — TRIAMCINOLONE ACETONIDE 40 MG/ML
40 INJECTION, SUSPENSION INTRA-ARTICULAR; INTRAMUSCULAR ONCE
Status: COMPLETED | OUTPATIENT
Start: 2022-07-27 | End: 2022-07-27

## 2022-07-27 RX ADMIN — TRIAMCINOLONE ACETONIDE 40 MG: 40 INJECTION, SUSPENSION INTRA-ARTICULAR; INTRAMUSCULAR at 15:05

## 2022-07-27 ASSESSMENT — ENCOUNTER SYMPTOMS
FEVER: 0
CHILLS: 0

## 2022-07-27 ASSESSMENT — PAIN SCALES - GENERAL: PAINLEVEL: NO PAIN (0)

## 2022-07-27 NOTE — NURSING NOTE
"Chief Complaint   Patient presents with     Eczema         Initial /66   Pulse 102   Temp 97.8  F (36.6  C) (Tympanic)   Resp 16   Ht 1.461 m (4' 9.5\")   Wt 51.2 kg (112 lb 12.8 oz)   LMP 07/17/2022   SpO2 100%   Breastfeeding No   BMI 23.99 kg/m   Estimated body mass index is 23.99 kg/m  as calculated from the following:    Height as of this encounter: 1.461 m (4' 9.5\").    Weight as of this encounter: 51.2 kg (112 lb 12.8 oz).         Norma J. Gosselin, CHRISTIE   "

## 2022-07-27 NOTE — PROGRESS NOTES
Assessment & Plan     Eczema, unspecified type  History of eczema, currently experiencing a flare.  Counseled on on risks, benefits, and potential outcomes of steroid therapy.  Triamcinolone 40 mg IM x 1 administered today.  Follow with Medrol dose pack tomorrow.  Continue moisturizer daily and topical steroid cream as needed.  Follow-up with any recurrence, persistence, or worsening.  - triamcinolone (KENALOG) 0.1 % external ointment; Apply topically 2 times daily  - methylPREDNISolone (MEDROL DOSEPAK) 4 MG tablet therapy pack; Follow Package Directions  - triamcinolone (KENALOG-40) injection 40 mg    Seborrheic dermatitis of scalp  Continue use of Ketoconazole shampoo as needed.  New prescription provided.  - ketoconazole (NIZORAL) 2 % external shampoo; Apply 5 to 10 mL to wet scalp, lather, leave on 3 to 5 minutes, and rinse; apply twice weekly for 4 weeks and then decrease to as needed    Nathalia Faust DO  [facility]   Marita is a 32 year old, presenting for the following health issues:  Eczema    History of Present Illness       Reason for visit:  Eczema treatment    She eats 2-3 servings of fruits and vegetables daily.She consumes 3 sweetened beverage(s) daily.She exercises with enough effort to increase her heart rate 20 to 29 minutes per day.  She exercises with enough effort to increase her heart rate 3 or less days per week.   She is taking medications regularly.     She has a history of eczema and reports that she is experiencing a flare of her symptoms for the past couple of weeks.  This has been progressively worsening.  She reports pruritic and scaling rash over her hands, palms, lower back, buttocks, hips, knees, and scalp.  She typically treats her eczema with topical steroid cream, but this has not been helpful during flares.  She also uses an OTC eczema cream.  She has tried treating her scalp with [employer]rogena dandruff shampoo without improvement.  She  "estimates that she will get a flare of her eczema about two times per year, typically around seasonal changes.  Her current flare is usual.    Review of Systems   Constitutional: Negative for chills and fever.          Objective    /66   Pulse 102   Temp 97.8  F (36.6  C) (Tympanic)   Resp 16   Ht 1.461 m (4' 9.5\")   Wt 51.2 kg (112 lb 12.8 oz)   LMP 07/17/2022   SpO2 100%   Breastfeeding No   BMI 23.99 kg/m    Body mass index is 23.99 kg/m .  Physical Exam  Constitutional:       General: She is not in acute distress.     Appearance: Normal appearance. She is not ill-appearing.   Pulmonary:      Effort: Pulmonary effort is normal.   Skin:     Comments: Diffuse eczematous rash on palms, wrist flexures, forearms, lower back and buttocks, groin, neck, and posterior aspect of scalp.   Neurological:      Mental Status: She is alert.   Psychiatric:         Mood and Affect: Mood normal.       "

## 2022-09-17 ENCOUNTER — HEALTH MAINTENANCE LETTER (OUTPATIENT)
Age: 32
End: 2022-09-17

## 2022-10-10 NOTE — PROGRESS NOTES
Assessment & Plan     1. Eczema, unspecified type  Longstanding history of eczema, currently experiencing worsening symptoms.  We will repeat IM steroid injection, oral Medrol Dosepak as below.  Will increase triamcinolone to 0.5% daily due to minimal improvement previously.  Continue with daily moisturizer.  Referral to dermatology for additional evaluation and alternative treatment consideration.  - Adult Dermatology Referral; Future  - triamcinolone (KENALOG-40) injection 40 mg  - methylPREDNISolone (MEDROL DOSEPAK) 4 MG tablet therapy pack; Follow Package Directions  Dispense: 21 tablet; Refill: 0  - triamcinolone (ARISTOCORT HP) 0.5 % external cream; Apply topically 2 times daily  Dispense: 454 g; Refill: 1    2. Seborrheic dermatitis of scalp  Continue with ketoconazole shampoo as needed as outlined below.  - ketoconazole (NIZORAL) 2 % external shampoo; Apply 5 to 10 mL to wet scalp, lather, leave on 3 to 5 minutes, and rinse; apply twice weekly for 4 weeks and then decrease to as needed  Dispense: 120 mL; Refill: 3    Declined pap smear screening today.       2 or more chronic illnesses with exacerbation and progression  Prescription drug management    Return if symptoms worsen or fail to improve.    Kimmie Allison PA-C  [facility] is a 32 year old, presenting for the following health issues:  Derm Problem      History of Present Illness       Reason for visit:  Exzema    She eats 2-3 servings of fruits and vegetables daily.She consumes 4 sweetened beverage(s) daily.She exercises with enough effort to increase her heart rate 10 to 19 minutes per day.  She exercises with enough effort to increase her heart rate 3 or less days per week.   She is taking medications regularly.     Patient with longstanding history of eczema.  Currently experience a flare of her symptoms for the past several weeks.  Reports scaling rash over hands and palms, scalp, low back,  buttocks, hips.  Most recently she was treated with an oral Medrol Dosepak, IM injection of triamcinolone, triamcinolone cream 0.1%.  She was also given prescription for ketoconazole cream for management of her scalp skin changes.  She reports improvement in her symptoms with these medications but she ran out of refills.  She had met with a dermatologist many years ago and is interested in repeat referral at this time.    PAST MEDICAL HISTORY:   Past Medical History:   Diagnosis Date     Sinus tachycardia 2012    occurred with dehydration during pregnancy. Normal EKG since then       PAST SURGICAL HISTORY:   Past Surgical History:   Procedure Laterality Date     HC TOOTH EXTRACTION W/FORCEP       LAPAROSCOPIC SALPINGECTOMY Bilateral 2018    Procedure: LAPAROSCOPIC SALPINGECTOMY;  Laparoscopic Bilateral Salpingectomy;  Surgeon: Surekha Pastor MD;  Location:  OR       FAMILY HISTORY:   Family History   Adopted: Yes   Problem Relation Age of Onset     No Known Problems Mother      No Known Problems Father      No Known Problems Brother        SOCIAL HISTORY:   Social History     Tobacco Use     Smoking status: Former     Packs/day: 0.25     Years: 1.00     Pack years: 0.25     Types: Cigarettes     Start date: 2008     Quit date: 2008     Years since quittin.8     Smokeless tobacco: Never   Substance Use Topics     Alcohol use: Not Currently     Comment: occ      No Known Allergies  Current Outpatient Medications   Medication     ketoconazole (NIZORAL) 2 % external shampoo     methylPREDNISolone (MEDROL DOSEPAK) 4 MG tablet therapy pack     methylPREDNISolone (MEDROL DOSEPAK) 4 MG tablet therapy pack     triamcinolone (ARISTOCORT HP) 0.5 % external cream     triamcinolone (KENALOG) 0.1 % external ointment     No current facility-administered medications for this visit.         Review of Systems   Per HPI        Objective    /56   Pulse 97   Temp 97.4  F (36.3  C)    "Resp 12   Ht 1.461 m (4' 9.5\")   Wt 51.8 kg (114 lb 3.2 oz)   LMP 10/10/2022   SpO2 100%   BMI 24.28 kg/m    Body mass index is 24.28 kg/m .  Physical Exam   General: Pleasant, in no apparent distress.  Skin: Diffuse erythematous, eczematous rash throughout Flexeril joints, palms, lower back, neck, scalp, forehead  Psych: Appropriate mood and affect.          "

## 2022-10-11 ENCOUNTER — OFFICE VISIT (OUTPATIENT)
Dept: FAMILY MEDICINE | Facility: OTHER | Age: 32
End: 2022-10-11
Attending: PHYSICIAN ASSISTANT
Payer: COMMERCIAL

## 2022-10-11 VITALS
OXYGEN SATURATION: 100 % | HEART RATE: 97 BPM | BODY MASS INDEX: 23.97 KG/M2 | SYSTOLIC BLOOD PRESSURE: 108 MMHG | TEMPERATURE: 97.4 F | WEIGHT: 114.2 LBS | DIASTOLIC BLOOD PRESSURE: 56 MMHG | RESPIRATION RATE: 12 BRPM | HEIGHT: 58 IN

## 2022-10-11 DIAGNOSIS — L21.9 SEBORRHEIC DERMATITIS OF SCALP: ICD-10-CM

## 2022-10-11 DIAGNOSIS — L30.9 ECZEMA, UNSPECIFIED TYPE: Primary | ICD-10-CM

## 2022-10-11 PROCEDURE — G0463 HOSPITAL OUTPT CLINIC VISIT: HCPCS | Mod: 25

## 2022-10-11 PROCEDURE — 250N000011 HC RX IP 250 OP 636: Performed by: PHYSICIAN ASSISTANT

## 2022-10-11 PROCEDURE — 99214 OFFICE O/P EST MOD 30 MIN: CPT | Performed by: PHYSICIAN ASSISTANT

## 2022-10-11 PROCEDURE — 96372 THER/PROPH/DIAG INJ SC/IM: CPT | Performed by: PHYSICIAN ASSISTANT

## 2022-10-11 PROCEDURE — G0463 HOSPITAL OUTPT CLINIC VISIT: HCPCS

## 2022-10-11 RX ORDER — TRIAMCINOLONE ACETONIDE 5 MG/G
CREAM TOPICAL 2 TIMES DAILY
Qty: 454 G | Refills: 1 | Status: SHIPPED | OUTPATIENT
Start: 2022-10-11 | End: 2023-02-06

## 2022-10-11 RX ORDER — METHYLPREDNISOLONE 4 MG
TABLET, DOSE PACK ORAL
Qty: 21 TABLET | Refills: 0 | Status: SHIPPED | OUTPATIENT
Start: 2022-10-11 | End: 2022-10-28

## 2022-10-11 RX ORDER — TRIAMCINOLONE ACETONIDE 40 MG/ML
40 INJECTION, SUSPENSION INTRA-ARTICULAR; INTRAMUSCULAR ONCE
Status: COMPLETED | OUTPATIENT
Start: 2022-10-11 | End: 2022-10-11

## 2022-10-11 RX ORDER — KETOCONAZOLE 20 MG/ML
SHAMPOO TOPICAL
Qty: 120 ML | Refills: 3 | Status: SHIPPED | OUTPATIENT
Start: 2022-10-11 | End: 2023-02-06

## 2022-10-11 RX ADMIN — TRIAMCINOLONE ACETONIDE 40 MG: 40 INJECTION, SUSPENSION INTRA-ARTICULAR; INTRAMUSCULAR at 10:38

## 2022-10-11 ASSESSMENT — PAIN SCALES - GENERAL: PAINLEVEL: MILD PAIN (3)

## 2022-10-11 NOTE — NURSING NOTE
Patient presents to clinic with eczema on body. She has not seen a dermatologist lately.  Pat Gallardo LPN ....................  10/11/2022   9:40 AM

## 2022-10-26 ENCOUNTER — MYC REFILL (OUTPATIENT)
Dept: FAMILY MEDICINE | Facility: OTHER | Age: 32
End: 2022-10-26

## 2022-10-26 DIAGNOSIS — L30.9 ECZEMA, UNSPECIFIED TYPE: ICD-10-CM

## 2022-10-26 RX ORDER — METHYLPREDNISOLONE 4 MG
TABLET, DOSE PACK ORAL
Qty: 21 TABLET | Refills: 0 | Status: CANCELLED | OUTPATIENT
Start: 2022-10-26

## 2022-10-28 ENCOUNTER — MYC MEDICAL ADVICE (OUTPATIENT)
Dept: FAMILY MEDICINE | Facility: OTHER | Age: 32
End: 2022-10-28

## 2022-10-28 DIAGNOSIS — L30.9 ECZEMA, UNSPECIFIED TYPE: ICD-10-CM

## 2022-10-28 RX ORDER — METHYLPREDNISOLONE 4 MG
TABLET, DOSE PACK ORAL
Qty: 21 TABLET | Refills: 0 | Status: SHIPPED | OUTPATIENT
Start: 2022-10-28 | End: 2022-12-30

## 2022-12-30 ENCOUNTER — MYC REFILL (OUTPATIENT)
Dept: FAMILY MEDICINE | Facility: OTHER | Age: 32
End: 2022-12-30

## 2022-12-30 DIAGNOSIS — L30.9 ECZEMA, UNSPECIFIED TYPE: ICD-10-CM

## 2023-01-02 RX ORDER — METHYLPREDNISOLONE 4 MG
TABLET, DOSE PACK ORAL
Qty: 21 TABLET | Refills: 0 | Status: SHIPPED | OUTPATIENT
Start: 2023-01-02 | End: 2023-02-06

## 2023-01-02 NOTE — TELEPHONE ENCOUNTER
Patient returned call and she verified her last name and . Pt states she has appointment scheduled with dermatologist in [place] on 23. Pt is miserable daily and requesting refill to get by to appointment.    In clinical absence of EVER Espinosa, patient is requesting that this message be sent to the covering provider for consideration please.    Judy Blanco RN .............. 2023  11:30 AM

## 2023-01-02 NOTE — TELEPHONE ENCOUNTER
Refill request received for methylprednisolone. Last filled on 10/28/22. Per [employer] message from EVER Mann, patient was to call and schedule follow up if her symptoms did not improve after second round of steroids. Called patient and left message to return call regarding refill request and to inquire as to whether or not she is still having symptoms.     KELSIE SOLIS RN on 1/2/2023 at 8:45 AM      .

## 2023-02-06 ENCOUNTER — OFFICE VISIT (OUTPATIENT)
Dept: FAMILY MEDICINE | Facility: OTHER | Age: 33
End: 2023-02-06
Attending: PHYSICIAN ASSISTANT
Payer: COMMERCIAL

## 2023-02-06 VITALS
SYSTOLIC BLOOD PRESSURE: 106 MMHG | WEIGHT: 114.4 LBS | RESPIRATION RATE: 16 BRPM | HEART RATE: 65 BPM | OXYGEN SATURATION: 98 % | TEMPERATURE: 97.8 F | DIASTOLIC BLOOD PRESSURE: 63 MMHG | BODY MASS INDEX: 24.33 KG/M2

## 2023-02-06 DIAGNOSIS — L30.9 ECZEMA, UNSPECIFIED TYPE: Primary | ICD-10-CM

## 2023-02-06 DIAGNOSIS — L29.9 ITCHING: ICD-10-CM

## 2023-02-06 PROCEDURE — G0463 HOSPITAL OUTPT CLINIC VISIT: HCPCS

## 2023-02-06 PROCEDURE — 99213 OFFICE O/P EST LOW 20 MIN: CPT | Performed by: PHYSICIAN ASSISTANT

## 2023-02-06 RX ORDER — TRIAMCINOLONE ACETONIDE 5 MG/G
CREAM TOPICAL 2 TIMES DAILY
Qty: 454 G | Refills: 1 | Status: SHIPPED | OUTPATIENT
Start: 2023-02-06

## 2023-02-06 RX ORDER — HYDROXYZINE PAMOATE 25 MG/1
25-50 CAPSULE ORAL 3 TIMES DAILY PRN
Qty: 50 CAPSULE | Refills: 11 | Status: SHIPPED | OUTPATIENT
Start: 2023-02-06

## 2023-02-06 RX ORDER — MINERAL OIL/HYDROPHIL PETROLAT
OINTMENT (GRAM) TOPICAL PRN
Qty: 450 G | Refills: 11 | Status: SHIPPED | OUTPATIENT
Start: 2023-02-06

## 2023-02-06 RX ORDER — METHYLPREDNISOLONE 4 MG
TABLET, DOSE PACK ORAL
Qty: 21 TABLET | Refills: 0 | Status: SHIPPED | OUTPATIENT
Start: 2023-02-06

## 2023-02-06 ASSESSMENT — PAIN SCALES - GENERAL: PAINLEVEL: MILD PAIN (2)

## 2023-02-06 NOTE — PROGRESS NOTES
Assessment & Plan   Problem List Items Addressed This Visit    None  Visit Diagnoses     Eczema, unspecified type    -  Primary    Relevant Medications    triamcinolone (ARISTOCORT HP) 0.5 % external cream    methylPREDNISolone (MEDROL DOSEPAK) 4 MG tablet therapy pack    mineral oil-hydrophilic petrolatum (AQUAPHOR) external ointment    hydrOXYzine (VISTARIL) 25 MG capsule    Other Relevant Orders    Adult Dermatology Referral    Itching        Relevant Medications    hydrOXYzine (VISTARIL) 25 MG capsule         Discussed symptoms at length.  Patient was given a refill of the Medrol Dosepak.  Refilled triamcinolone cream.  If side effect profile.  Patient was given a prescription for Aquaphor ointment to use several times daily especially after showering to help calm down the inflammation.  Encouraged to take a shower every other day to help decrease skin irritation.  She was given hydroxyzine to use as needed for itching.  Referred to dermatology for a consult.  Gave patient education.    Prescription drug management    See Patient Instructions    Return if symptoms worsen or fail to improve.    Agnieszka Pritchett PA-C  [facility] AND [facility] is a 32 year old, presenting for the following health issues:  Eczema      History of Present Illness       Reason for visit:  Ezema    She eats 2-3 servings of fruits and vegetables daily.She consumes 3 sweetened beverage(s) daily.She exercises with enough effort to increase her heart rate 20 to 29 minutes per day.  She exercises with enough effort to increase her heart rate 3 or less days per week.   She is taking medications regularly.       Follow up eczema     Patient's history of eczema for several years.  Had an appointment with dermatology in [place] however she was unable to go due to the poor weather.  Had to reschedule the appointment to April.  Currently having a repeated flare.  Patient's eczema has flared over the last year with  most recent flare a few weeks ago.  Inflammation is on her cheeks, chin, around her eyes, arms, abdomen, back, and legs.  History of dry, red, and itchy skin.  It is noninflamed.  Has constant itching on her scalp.  History of eczema since being a teenager.  No known triggers.  Uses unscented lotion and soap.  Moved last April.  Had a flareup starting prior to the move however it worsened after the move.  Has used Medrol Dosepak's in the past to help calm down the severity.  Hands and her skin will clear with a Medrol Dosepak however her scalp is more resistant and is very itchy.  Tried using ketoconazole shampoo in the past for flareups however this made symptoms worse.  Uses Benadryl as needed which helps for the itching but not with the rash.  No new foods, medications, lotions, soaps, recent travel.    Review of Systems   Constitutional, HEENT, cardiovascular, pulmonary, gi and gu systems are negative, except as otherwise noted.      Objective    /63 (BP Location: Right arm, Patient Position: Sitting, Cuff Size: Adult Regular)   Pulse 65   Temp 97.8  F (36.6  C) (Tympanic)   Resp 16   Wt 51.9 kg (114 lb 6.4 oz)   LMP 01/28/2023 (Approximate)   SpO2 98%   BMI 24.33 kg/m    Body mass index is 24.33 kg/m .  Physical Exam  Vitals and nursing note reviewed.   Constitutional:       Appearance: Normal appearance.   HENT:      Head: Normocephalic and atraumatic.   Eyes:      Extraocular Movements: Extraocular movements intact.      Conjunctiva/sclera: Conjunctivae normal.      Pupils: Pupils are equal, round, and reactive to light.   Cardiovascular:      Rate and Rhythm: Normal rate and regular rhythm.      Heart sounds: Normal heart sounds.   Pulmonary:      Effort: Pulmonary effort is normal.      Breath sounds: Normal breath sounds.   Musculoskeletal:         General: Normal range of motion.   Skin:     General: Skin is warm and dry.      Comments: Mild erythematous pinpoint papules scattered throughout  the eyes, cheeks, chin, abdomen, lower back, and arms.  Dry skin appreciated on the arms.  No open wound, pus, drainage, warmth.   Neurological:      General: No focal deficit present.      Mental Status: She is alert and oriented to person, place, and time.   Psychiatric:         Mood and Affect: Mood normal.         Behavior: Behavior normal.

## 2023-02-06 NOTE — NURSING NOTE
Pt presents to clinic today for an eczema follow up. States its going back and forth to being ok and getting worse.       FOOD SECURITY SCREENING QUESTIONS:    The next two questions are to help us understand your food security.  If you are feeling you need any assistance in this area, we have resources available to support you today.    Hunger Vital Signs:  Within the past 12 months we worried whether our food would run out before we got money to buy more. Never  Within the past 12 months the food we bought just didn't last and we didn't have money to get more. Never          Medication Reconciliation: complete  Esther Mccoy LPN,LPN on 2/6/2023 at 11:47 AM

## 2023-02-17 ENCOUNTER — TRANSFERRED RECORDS (OUTPATIENT)
Dept: HEALTH INFORMATION MANAGEMENT | Facility: OTHER | Age: 33
End: 2023-02-17
Payer: COMMERCIAL

## 2023-07-12 NOTE — PROGRESS NOTES
SUBJECTIVE:   CC: Marita is an 33 year old who presents for preventive health visit.     Healthy Habits:     Getting at least 3 servings of Calcium per day:  NO    Bi-annual eye exam:  Yes    Dental care twice a year:  NO    Sleep apnea or symptoms of sleep apnea:  None    Diet:  Regular (no restrictions)    Frequency of exercise:  1 day/week    Duration of exercise:  15-30 minutes    Taking medications regularly:  Yes    Medication side effects:  None    Additional concerns today:  No      Here for annual physical. Concerns as below.     Patient has longstanding troubles with eczema/atopic dermatitis.  Has been following with dermatology professionals for the last few months.  Only records available to review are from 2/23 where they recommended clobetasol solution for scalp management.  Since then patient has been struggle with increased scalp rash and hair loss/thinning.  Reports she was placed on Dupixent by dermatology with noticing exacerbation of symptoms.  She has follow-up with dermatology scheduled for later today.  Patient and  are requesting lab work be completed today to determine if there could be a different underlying cause.  They are requesting thyroid check, check of all hormone levels in addition to vitamin levels and basic lab work.    Contraception: Tubal  Risk for STI?: No  Last pap: 10-12 years ago per patient report  Any hx of abnormal paps:  No  FH of early CA?: No  Cholesterol/DM concerns/screening: Due  Tobacco?: No  Calcium intake: Dietary  DEXA: Not indicated based on patient age and health status.   Last mammo: Not indicated based on patient age and health status.   Colonoscopy: Not indicated based on patient age and health status.   Hepatitis C screen: Low risk, declines   HIV screen: Low risk, declines   Immunizations: UTD      Have you ever done Advance Care Planning? (For example, a Health Directive, POLST, or a discussion with a medical provider or your loved ones about  your wishes): No, advance care planning information given to patient to review.  Patient declined advance care planning discussion at this time.    Social History     Tobacco Use     Smoking status: Former     Packs/day: 0.25     Years: 1.00     Pack years: 0.25     Types: Cigarettes     Start date: 2008     Quit date: 2008     Years since quittin.5     Smokeless tobacco: Never   Substance Use Topics     Alcohol use: Not Currently     Comment: occ       Reviewed orders with patient.  Reviewed health maintenance and updated orders accordingly - Yes      Breast Cancer Screening:  Any new diagnosis of family breast, ovarian, or bowel cancer? No    FHS-7:        No data to display                Patient under 40 years of age: Routine Mammogram Screening not recommended.   Pertinent mammograms are reviewed under the imaging tab.    History of abnormal Pap smear: Last 3 Pap and HPV Results:         Reviewed and updated as needed this visit by clinical staff   Tobacco  Allergies  Meds      Soc Hx        Reviewed and updated as needed this visit by Provider                 Past Medical History:   Diagnosis Date     Sinus tachycardia 2012    occurred with dehydration during pregnancy. Normal EKG since then      Past Surgical History:   Procedure Laterality Date     HC TOOTH EXTRACTION W/FORCEP       LAPAROSCOPIC SALPINGECTOMY Bilateral 2018    Procedure: LAPAROSCOPIC SALPINGECTOMY;  Laparoscopic Bilateral Salpingectomy;  Surgeon: Surekha Pastor MD;  Location: [facility] OR     OB History    Para Term  AB Living   4 4 4 0 0 4   SAB IAB Ectopic Multiple Live Births   0 0 0 0 4      # Outcome Date GA Lbr Jeremy/2nd Weight Sex Delivery Anes PTL Lv   4 Term 10/04/16   3.345 kg (7 lb 6 oz) M    ADAN      Name: Max Stafford   3 Term 14   3.629 kg (8 lb) M    ADAN      Name: Niall Stafford   2 Term 12   3.969 kg (8 lb 12 oz) F    ADAN      Name: Teresa Stafford  "  1 Term 09/14/10   3.317 kg (7 lb 5 oz) F    ADAN      Name: Michelle Stafford       Review of Systems   Constitutional: Negative for chills and fever.   HENT: Negative for congestion, ear pain, hearing loss and sore throat.    Eyes: Negative for pain and visual disturbance.   Respiratory: Negative for cough and shortness of breath.    Cardiovascular: Negative for chest pain, palpitations and peripheral edema.   Gastrointestinal: Negative for abdominal pain, constipation, diarrhea, heartburn, hematochezia and nausea.   Breasts:  Negative for tenderness, breast mass and discharge.   Genitourinary: Negative for dysuria, frequency, genital sores, hematuria, pelvic pain, urgency, vaginal bleeding and vaginal discharge.   Musculoskeletal: Negative for arthralgias, joint swelling and myalgias.   Skin: Positive for rash.   Neurological: Positive for dizziness and weakness. Negative for headaches and paresthesias.   Psychiatric/Behavioral: Negative for mood changes. The patient is not nervous/anxious.           OBJECTIVE:   /58   Pulse 82   Temp (!) 96.7  F (35.9  C)   Resp 14   Ht 1.48 m (4' 10.25\")   Wt 55 kg (121 lb 3.2 oz)   LMP 2023 (Exact Date)   SpO2 98%   BMI 25.11 kg/m    Physical Exam  GENERAL: healthy, alert and no distress  EYES: Eyes grossly normal to inspection, PERRL and conjunctivae and sclerae normal  HENT: ear canals and TM's normal, nose and mouth without ulcers or lesions  NECK: no adenopathy, no asymmetry, masses, or scars and thyroid normal to palpation  RESP: lungs clear to auscultation - no rales, rhonchi or wheezes  CV: regular rate and rhythm, normal S1 S2, no S3 or S4, no murmur, click or rub, no peripheral edema and peripheral pulses strong  MS: no gross musculoskeletal defects noted, no edema  SKIN: no suspicious lesions or rashes  NEURO: Normal strength and tone, mentation intact and speech normal  PSYCH: mentation appears normal, affect normal/bright    Diagnostic Test " Results:  Labs reviewed in Epic    ASSESSMENT/PLAN:       ICD-10-CM    1. Routine history and physical examination of adult  Z00.00 CBC and Differential     Comprehensive Metabolic Panel     TSH Reflex GH     Iron     Vitamin B12     Vitamin D Total     Hemoglobin A1c     Lipid Panel     Lipid Panel     Hemoglobin A1c     Vitamin B12     Iron     TSH Reflex GH     Comprehensive Metabolic Panel     CBC and Differential      2. Cervical cancer screening  Z12.4 CANCELED: Pap Screen Thin Prep      3. Patient request for diagnostic testing  Z01.89 Estradiol     17 OH progesterone     Testosterone, Total     Testosterone, Total     17 OH progesterone     Estradiol      4. Atopic dermatitis, unspecified type  L20.9 Iron     Vitamin B12     Vitamin D Total     Estradiol     17 OH progesterone     Testosterone, Total     Testosterone, Total     17 OH progesterone     Estradiol     Vitamin D Total     Vitamin B12     Iron        1, 2.  Due for Pap smear, unable to review outside results.  Patient deferred today.  Otherwise up-to-date on preventative exams.  Basic lab work pending as above.  Up-to-date on immunizations.  Encouraged healthy lifestyle.    3, 4.  Longstanding difficulties with atopic dermatitis and scalp dermatitis for which she follows with dermatology.  Lab work pending as requested by patient.  Will notify with results and treat if indicated.  Recommend follow-up with dermatology as scheduled for later today.  Consider possible scalp biopsy.    Patient has been advised of split billing requirements and indicates understanding: Yes      COUNSELING:  Reviewed preventive health counseling, as reflected in patient instructions        She reports that she quit smoking about 14 years ago. Her smoking use included cigarettes. She started smoking about 15 years ago. She has a 0.25 pack-year smoking history. She has never used smokeless tobacco.      Kimmie Allison PA-C  [facility] AND hospitals

## 2023-07-14 ENCOUNTER — OFFICE VISIT (OUTPATIENT)
Dept: FAMILY MEDICINE | Facility: OTHER | Age: 33
End: 2023-07-14
Attending: PHYSICIAN ASSISTANT
Payer: COMMERCIAL

## 2023-07-14 VITALS
DIASTOLIC BLOOD PRESSURE: 58 MMHG | HEART RATE: 82 BPM | TEMPERATURE: 96.7 F | OXYGEN SATURATION: 98 % | WEIGHT: 121.2 LBS | BODY MASS INDEX: 25.44 KG/M2 | SYSTOLIC BLOOD PRESSURE: 110 MMHG | HEIGHT: 58 IN | RESPIRATION RATE: 14 BRPM

## 2023-07-14 DIAGNOSIS — L20.9 ATOPIC DERMATITIS, UNSPECIFIED TYPE: ICD-10-CM

## 2023-07-14 DIAGNOSIS — Z01.89 PATIENT REQUEST FOR DIAGNOSTIC TESTING: ICD-10-CM

## 2023-07-14 DIAGNOSIS — Z12.4 CERVICAL CANCER SCREENING: ICD-10-CM

## 2023-07-14 DIAGNOSIS — Z00.00 ROUTINE HISTORY AND PHYSICAL EXAMINATION OF ADULT: Primary | ICD-10-CM

## 2023-07-14 LAB
ALBUMIN SERPL BCG-MCNC: 4.6 G/DL (ref 3.5–5.2)
ALP SERPL-CCNC: 84 U/L (ref 35–104)
ALT SERPL W P-5'-P-CCNC: 14 U/L (ref 0–50)
ANION GAP SERPL CALCULATED.3IONS-SCNC: 9 MMOL/L (ref 7–15)
AST SERPL W P-5'-P-CCNC: 17 U/L (ref 0–45)
BASOPHILS # BLD AUTO: 0.1 10E3/UL (ref 0–0.2)
BASOPHILS NFR BLD AUTO: 1 %
BILIRUB SERPL-MCNC: 0.6 MG/DL
BUN SERPL-MCNC: 12.1 MG/DL (ref 6–20)
CALCIUM SERPL-MCNC: 9.7 MG/DL (ref 8.6–10)
CHLORIDE SERPL-SCNC: 102 MMOL/L (ref 98–107)
CHOLEST SERPL-MCNC: 226 MG/DL
CREAT SERPL-MCNC: 0.61 MG/DL (ref 0.51–0.95)
DEPRECATED HCO3 PLAS-SCNC: 28 MMOL/L (ref 22–29)
EOSINOPHIL # BLD AUTO: 0.2 10E3/UL (ref 0–0.7)
EOSINOPHIL NFR BLD AUTO: 2 %
ERYTHROCYTE [DISTWIDTH] IN BLOOD BY AUTOMATED COUNT: 15.6 % (ref 10–15)
ESTRADIOL SERPL-MCNC: 73 PG/ML
GFR SERPL CREATININE-BSD FRML MDRD: >90 ML/MIN/1.73M2
GLUCOSE SERPL-MCNC: 90 MG/DL (ref 70–99)
HBA1C MFR BLD: 5 % (ref 4–6.2)
HCT VFR BLD AUTO: 34.2 % (ref 35–47)
HDLC SERPL-MCNC: 109 MG/DL
HGB BLD-MCNC: 11 G/DL (ref 11.7–15.7)
IMM GRANULOCYTES # BLD: 0 10E3/UL
IMM GRANULOCYTES NFR BLD: 0 %
IRON SERPL-MCNC: 36 UG/DL (ref 37–145)
LDLC SERPL CALC-MCNC: 104 MG/DL
LYMPHOCYTES # BLD AUTO: 1.9 10E3/UL (ref 0.8–5.3)
LYMPHOCYTES NFR BLD AUTO: 18 %
MCH RBC QN AUTO: 28.1 PG (ref 26.5–33)
MCHC RBC AUTO-ENTMCNC: 32.2 G/DL (ref 31.5–36.5)
MCV RBC AUTO: 88 FL (ref 78–100)
MONOCYTES # BLD AUTO: 0.8 10E3/UL (ref 0–1.3)
MONOCYTES NFR BLD AUTO: 8 %
NEUTROPHILS # BLD AUTO: 7.2 10E3/UL (ref 1.6–8.3)
NEUTROPHILS NFR BLD AUTO: 71 %
NONHDLC SERPL-MCNC: 117 MG/DL
NRBC # BLD AUTO: 0 10E3/UL
NRBC BLD AUTO-RTO: 0 /100
PLATELET # BLD AUTO: 414 10E3/UL (ref 150–450)
POTASSIUM SERPL-SCNC: 3.5 MMOL/L (ref 3.4–5.3)
PROT SERPL-MCNC: 7.7 G/DL (ref 6.4–8.3)
RBC # BLD AUTO: 3.91 10E6/UL (ref 3.8–5.2)
SODIUM SERPL-SCNC: 139 MMOL/L (ref 136–145)
TRIGL SERPL-MCNC: 63 MG/DL
TSH SERPL DL<=0.005 MIU/L-ACNC: 0.53 UIU/ML (ref 0.3–4.2)
VIT B12 SERPL-MCNC: 557 PG/ML (ref 232–1245)
WBC # BLD AUTO: 10 10E3/UL (ref 4–11)

## 2023-07-14 PROCEDURE — 85025 COMPLETE CBC W/AUTO DIFF WBC: CPT | Mod: ZL | Performed by: PHYSICIAN ASSISTANT

## 2023-07-14 PROCEDURE — 82607 VITAMIN B-12: CPT | Mod: ZL | Performed by: PHYSICIAN ASSISTANT

## 2023-07-14 PROCEDURE — 83498 ASY HYDROXYPROGESTERONE 17-D: CPT | Mod: ZL | Performed by: PHYSICIAN ASSISTANT

## 2023-07-14 PROCEDURE — 82306 VITAMIN D 25 HYDROXY: CPT | Mod: ZL | Performed by: PHYSICIAN ASSISTANT

## 2023-07-14 PROCEDURE — 84403 ASSAY OF TOTAL TESTOSTERONE: CPT | Mod: ZL | Performed by: PHYSICIAN ASSISTANT

## 2023-07-14 PROCEDURE — 82670 ASSAY OF TOTAL ESTRADIOL: CPT | Mod: ZL | Performed by: PHYSICIAN ASSISTANT

## 2023-07-14 PROCEDURE — 80061 LIPID PANEL: CPT | Mod: ZL | Performed by: PHYSICIAN ASSISTANT

## 2023-07-14 PROCEDURE — 83036 HEMOGLOBIN GLYCOSYLATED A1C: CPT | Mod: ZL | Performed by: PHYSICIAN ASSISTANT

## 2023-07-14 PROCEDURE — 83540 ASSAY OF IRON: CPT | Mod: ZL | Performed by: PHYSICIAN ASSISTANT

## 2023-07-14 PROCEDURE — G0463 HOSPITAL OUTPT CLINIC VISIT: HCPCS

## 2023-07-14 PROCEDURE — 80053 COMPREHEN METABOLIC PANEL: CPT | Mod: ZL | Performed by: PHYSICIAN ASSISTANT

## 2023-07-14 PROCEDURE — 36415 COLL VENOUS BLD VENIPUNCTURE: CPT | Mod: ZL | Performed by: PHYSICIAN ASSISTANT

## 2023-07-14 PROCEDURE — 99395 PREV VISIT EST AGE 18-39: CPT | Performed by: PHYSICIAN ASSISTANT

## 2023-07-14 PROCEDURE — 84443 ASSAY THYROID STIM HORMONE: CPT | Mod: ZL | Performed by: PHYSICIAN ASSISTANT

## 2023-07-14 PROCEDURE — 99212 OFFICE O/P EST SF 10 MIN: CPT | Mod: 25 | Performed by: PHYSICIAN ASSISTANT

## 2023-07-14 ASSESSMENT — ENCOUNTER SYMPTOMS
DIZZINESS: 1
CONSTIPATION: 0
DIARRHEA: 0
ARTHRALGIAS: 0
NERVOUS/ANXIOUS: 0
WEAKNESS: 1
SORE THROAT: 0
COUGH: 0
BREAST MASS: 0
HEADACHES: 0
PALPITATIONS: 0
FEVER: 0
NAUSEA: 0
HEARTBURN: 0
SHORTNESS OF BREATH: 0
FREQUENCY: 0
HEMATOCHEZIA: 0
HEMATURIA: 0
CHILLS: 0
DYSURIA: 0
MYALGIAS: 0
PARESTHESIAS: 0
ABDOMINAL PAIN: 0
EYE PAIN: 0
JOINT SWELLING: 0

## 2023-07-14 ASSESSMENT — PAIN SCALES - GENERAL: PAINLEVEL: NO PAIN (0)

## 2023-07-14 NOTE — NURSING NOTE
Patient presents to clinic for annual physical. Has concerns about hair loss and requesting an A1C be checked.  Pat Gallardo LPN ....................  7/14/2023   8:06 AM  EXT [id number]

## 2023-07-15 LAB — DEPRECATED CALCIDIOL+CALCIFEROL SERPL-MC: 56 UG/L (ref 20–75)

## 2023-07-20 LAB
17OHP SERPL-MCNC: 23 NG/DL
TESTOST SERPL-MCNC: 11 NG/DL (ref 8–60)

## 2025-04-02 ENCOUNTER — OFFICE VISIT (OUTPATIENT)
Dept: FAMILY MEDICINE | Facility: OTHER | Age: 35
End: 2025-04-02
Attending: FAMILY MEDICINE
Payer: COMMERCIAL

## 2025-04-02 VITALS
OXYGEN SATURATION: 99 % | RESPIRATION RATE: 16 BRPM | TEMPERATURE: 97.7 F | HEART RATE: 78 BPM | BODY MASS INDEX: 27.68 KG/M2 | SYSTOLIC BLOOD PRESSURE: 116 MMHG | DIASTOLIC BLOOD PRESSURE: 62 MMHG | WEIGHT: 133.6 LBS

## 2025-04-02 DIAGNOSIS — D64.9 NORMOCYTIC ANEMIA: ICD-10-CM

## 2025-04-02 DIAGNOSIS — R42 VERTIGO: ICD-10-CM

## 2025-04-02 DIAGNOSIS — R00.0 TACHYCARDIA: ICD-10-CM

## 2025-04-02 DIAGNOSIS — R42 DIZZINESS: Primary | ICD-10-CM

## 2025-04-02 LAB
ALBUMIN SERPL BCG-MCNC: 4.3 G/DL (ref 3.5–5.2)
ALP SERPL-CCNC: 105 U/L (ref 40–150)
ALT SERPL W P-5'-P-CCNC: 16 U/L (ref 0–50)
ANION GAP SERPL CALCULATED.3IONS-SCNC: 11 MMOL/L (ref 7–15)
AST SERPL W P-5'-P-CCNC: 24 U/L (ref 0–45)
BILIRUB SERPL-MCNC: 0.5 MG/DL
BUN SERPL-MCNC: 12 MG/DL (ref 6–20)
CALCIUM SERPL-MCNC: 9.1 MG/DL (ref 8.8–10.4)
CHLORIDE SERPL-SCNC: 105 MMOL/L (ref 98–107)
CREAT SERPL-MCNC: 0.64 MG/DL (ref 0.51–0.95)
EGFRCR SERPLBLD CKD-EPI 2021: >90 ML/MIN/1.73M2
ERYTHROCYTE [DISTWIDTH] IN BLOOD BY AUTOMATED COUNT: 16.6 % (ref 10–15)
FERRITIN SERPL-MCNC: 19 NG/ML (ref 6–175)
GLUCOSE SERPL-MCNC: 97 MG/DL (ref 70–99)
HCO3 SERPL-SCNC: 26 MMOL/L (ref 22–29)
HCT VFR BLD AUTO: 32.8 % (ref 35–47)
HGB BLD-MCNC: 10.5 G/DL (ref 11.7–15.7)
IRON BINDING CAPACITY (ROCHE): 362 UG/DL (ref 240–430)
IRON SATN MFR SERPL: 18 % (ref 15–46)
IRON SERPL-MCNC: 64 UG/DL (ref 37–145)
MCH RBC QN AUTO: 28.8 PG (ref 26.5–33)
MCHC RBC AUTO-ENTMCNC: 32 G/DL (ref 31.5–36.5)
MCV RBC AUTO: 90 FL (ref 78–100)
PLATELET # BLD AUTO: 395 10E3/UL (ref 150–450)
POTASSIUM SERPL-SCNC: 3.9 MMOL/L (ref 3.4–5.3)
PROT SERPL-MCNC: 7.5 G/DL (ref 6.4–8.3)
RBC # BLD AUTO: 3.65 10E6/UL (ref 3.8–5.2)
SODIUM SERPL-SCNC: 142 MMOL/L (ref 135–145)
TSH SERPL DL<=0.005 MIU/L-ACNC: 2.19 UIU/ML (ref 0.3–4.2)
VIT B12 SERPL-MCNC: 418 PG/ML (ref 232–1245)
WBC # BLD AUTO: 7.2 10E3/UL (ref 4–11)

## 2025-04-02 PROCEDURE — 82607 VITAMIN B-12: CPT | Mod: ZL | Performed by: FAMILY MEDICINE

## 2025-04-02 PROCEDURE — 36415 COLL VENOUS BLD VENIPUNCTURE: CPT | Mod: ZL | Performed by: FAMILY MEDICINE

## 2025-04-02 PROCEDURE — 83540 ASSAY OF IRON: CPT | Mod: ZL | Performed by: FAMILY MEDICINE

## 2025-04-02 PROCEDURE — 84443 ASSAY THYROID STIM HORMONE: CPT | Mod: ZL | Performed by: FAMILY MEDICINE

## 2025-04-02 PROCEDURE — 85018 HEMOGLOBIN: CPT | Mod: ZL | Performed by: FAMILY MEDICINE

## 2025-04-02 PROCEDURE — 80053 COMPREHEN METABOLIC PANEL: CPT | Mod: ZL | Performed by: FAMILY MEDICINE

## 2025-04-02 PROCEDURE — 93242 EXT ECG>48HR<7D RECORDING: CPT | Performed by: FAMILY MEDICINE

## 2025-04-02 PROCEDURE — 82728 ASSAY OF FERRITIN: CPT | Mod: ZL | Performed by: FAMILY MEDICINE

## 2025-04-02 PROCEDURE — 83550 IRON BINDING TEST: CPT | Mod: ZL | Performed by: FAMILY MEDICINE

## 2025-04-02 PROCEDURE — G0463 HOSPITAL OUTPT CLINIC VISIT: HCPCS

## 2025-04-02 ASSESSMENT — PAIN SCALES - GENERAL: PAINLEVEL_OUTOF10: NO PAIN (0)

## 2025-04-02 NOTE — PROGRESS NOTES
Assessment & Plan       ICD-10-CM    1. Dizziness  R42 TSH Reflex GH     CBC W PLT No Diff     Ferritin     Comprehensive Metabolic Panel     HC ZIO PATCH 3-7 DAYS APPLICATION     ZIO PATCH 3-7 DAYS (additional cost to patient)     EKG 12-lead complete w/read - Clinics     Physical Therapy  Referral     TSH Reflex GH     CBC W PLT No Diff     Ferritin     Comprehensive Metabolic Panel      2. Vertigo  R42 TSH Reflex GH     CBC W PLT No Diff     Ferritin     Comprehensive Metabolic Panel     HC ZIO PATCH 3-7 DAYS APPLICATION     ZIO PATCH 3-7 DAYS (additional cost to patient)     EKG 12-lead complete w/read - Clinics     Physical Therapy  Referral     TSH Reflex GH     CBC W PLT No Diff     Ferritin     Comprehensive Metabolic Panel      3. Tachycardia  R00.0 TSH Reflex GH     CBC W PLT No Diff     Ferritin     Comprehensive Metabolic Panel     HC ZIO PATCH 3-7 DAYS APPLICATION     ZIO PATCH 3-7 DAYS (additional cost to patient)     EKG 12-lead complete w/read - Clinics     TSH Reflex GH     CBC W PLT No Diff     Ferritin     Comprehensive Metabolic Panel      4. Normocytic anemia  D64.9 Iron & Iron Binding Capacity     Vitamin B12     Iron & Iron Binding Capacity     Vitamin B12          From initial evaluation with lab work, EKG, and Zio patch.  Discussed possible differential.  It certainly is possible that patient has acute labyrinthitis or benign positional vertigo.  Did discuss the possibility of PT if symptoms do not improve, will place referral so this can get scheduled if needed.  We also discussed the possibility of POTS, although it would be quite early to entertain this.  I did recommend that she focus on hydration and consider increasing her salt intake.  She does have a history of normocytic anemia, not currently on any iron, will further evaluate for the that is a contributing factor.    Further recommendations based on lab results.        No follow-ups on file.    Subjective    Marita is a 34 year old, presenting for the following health issues:  Dizziness (Here for on going dizziness and also heart.  Dizziness started 3/28, after her period she had waves of dizziness through today. /Heart is racing at times. She won't be doing a lot or she will be and when she checks her heart rate it is at 115-125. )        4/2/2025     4:06 PM   Additional Questions   Roomed by Tatiana   Accompanied by self     HPI      Patient has been having intermittent dizziness for the past 5 or 6 days.  Symptoms started with the onset of her recent period.  She feels like her periods are pretty normal, not particularly heavy.  She does have a heavier day on days 1 through 2, total menstruation lasts about 5 days.    Occasionally she will feel the room is spinning, but most often she feels like she is spinning.  Earlier today when she noticed symptoms it was after she had been bending down to  something.  Even when she is not having symptoms, she just feels off.    She does have a history of normocytic anemia, labs last checked July 2023.    No recent URI/viral/cold symptoms.    She also notes intermittent problems with an elevated heart rate.  This has been happening for longer than the dizzy symptoms.  She is not really sure if they are linked.  Noticed episodes of tachycardia when at work, her heart rate is typically in the 1 15-1 20 range and does not easily come down even with resting and hydration.   The dizziness does not necessarily seem to cause tachycardia and the tachycardia did not really seem to cause dizziness.    She has a history of a tubal ligation.  Currently on any medications.    No visual changes/ double vistion          Objective    /62 (BP Location: Right arm, Patient Position: Sitting, Cuff Size: Adult Regular)   Pulse 78   Temp 97.7  F (36.5  C) (Tympanic)   Resp 16   Wt 60.6 kg (133 lb 9.6 oz)   LMP 03/28/2025 (Approximate)   SpO2 99%   BMI 27.68 kg/m    Body mass  index is 27.68 kg/m .  Physical Exam  Constitutional:       Appearance: She is well-developed.   Eyes:      Conjunctiva/sclera: Conjunctivae normal.   Neck:      Thyroid: No thyromegaly.   Cardiovascular:      Rate and Rhythm: Normal rate and regular rhythm.      Heart sounds: Normal heart sounds. No murmur heard.  Pulmonary:      Effort: Pulmonary effort is normal. No respiratory distress.      Breath sounds: Normal breath sounds.   Abdominal:      Palpations: Abdomen is soft.   Lymphadenopathy:      Cervical: No cervical adenopathy.   Skin:     Findings: No rash.   Neurological:      Mental Status: She is alert and oriented to person, place, and time.      Cranial Nerves: No cranial nerve deficit.      Sensory: No sensory deficit.      Motor: No weakness.      Coordination: Coordination normal.      Comments: Normal rapid alternating movement, negative rhomberg, pronator drift. Normal finger-nose-finger and heal to shin testing.          Signed Electronically by: Yocasta Tierney MD

## 2025-04-02 NOTE — PROGRESS NOTES
Patient arrived (date)4/2/2025 (time)1700 for a 7 day Zio monitor per (provider name) MD Niall for Dizziness (Dx).  Patient's skin was prepped per protocol.  Zio monitor was placed.  Patient verbalized understanding of instructions and plan of care.  Patient was given Zio diary and prepaid .

## 2025-04-03 LAB
ATRIAL RATE - MUSE: 73 BPM
DIASTOLIC BLOOD PRESSURE - MUSE: NORMAL MMHG
INTERPRETATION ECG - MUSE: NORMAL
P AXIS - MUSE: 57 DEGREES
PR INTERVAL - MUSE: 128 MS
QRS DURATION - MUSE: 78 MS
QT - MUSE: 404 MS
QTC - MUSE: 445 MS
R AXIS - MUSE: 62 DEGREES
SYSTOLIC BLOOD PRESSURE - MUSE: NORMAL MMHG
T AXIS - MUSE: 55 DEGREES
VENTRICULAR RATE- MUSE: 73 BPM

## 2025-04-05 ENCOUNTER — HEALTH MAINTENANCE LETTER (OUTPATIENT)
Age: 35
End: 2025-04-05

## 2025-04-16 ENCOUNTER — TELEPHONE (OUTPATIENT)
Dept: FAMILY MEDICINE | Facility: OTHER | Age: 35
End: 2025-04-16
Payer: COMMERCIAL

## 2025-04-16 NOTE — TELEPHONE ENCOUNTER
Called and spoke with patient.  Patient states that she had a Zio patch done at [facility] on 4/2/25 and scheduled appt on 4/25/25 to follow up on results of that.  States she just sent Zio patch in on Saturday.    Patient states that she is not having dizziness anymore since starting iron tablet.    Patient states she does continue to have heart racing through out the day especially when she is doing activities.    States it is worse when she is at work.  States at times she does get short of breath with activities but not all the time.    Informed patient will route to provider for review and advise.      Kizzy Deleon RN on 4/16/2025 at 11:57 AM

## 2025-04-16 NOTE — TELEPHONE ENCOUNTER
Noted. Will address concerns further at upcoming scheduled appointment.  Kimmie Allison PA-C on 4/16/2025 at 6:18 PM

## 2025-04-16 NOTE — TELEPHONE ENCOUNTER
"Please triage patient for \"heart arrhythmia\". Has appointment with [facility] on 04/25/25. Thank you.     Nathalia Thomas LPN on 4/16/2025 at 11:28 AM Ext [phone]   "

## 2025-04-17 ENCOUNTER — TELEPHONE (OUTPATIENT)
Dept: FAMILY MEDICINE | Facility: OTHER | Age: 35
End: 2025-04-17
Payer: COMMERCIAL

## 2025-04-17 LAB — CV ZIO PRELIM RESULTS: NORMAL

## 2025-04-17 NOTE — TELEPHONE ENCOUNTER
Spoke with patient and informed of Kimmie Allison response and patient verbalized understanding  Kizzy Deleon RN on 4/17/2025 at 9:27 AM

## 2025-05-05 NOTE — PROGRESS NOTES
Assessment & Plan     Tachycardia  Persistent palpitations/tachycardia.  Reviewed Zio patch that was largely revealing.  Due to persistence of symptoms patient would like to meet with cardiology to discuss options further.  Referral placed.  - Adult Cardiology Judith Mckinnon Referral; Future    Eczema, unspecified type  Chronic, stable.  Refill as below.  - triamcinolone (ARISTOCORT HP) 0.5 % external cream; Apply topically 2 times daily.    Normocytic anemia  Recently started iron and has noticed improvement in symptoms.  Recheck labs as below for follow-up.  - CBC and Differential; Future  - Ferritin; Future  - Iron & Iron Binding Capacity; Future  - CBC and Differential  - Ferritin  - Iron & Iron Binding Capacity    Acute pain of left shoulder  Exam consistent with probable rotator cuff strain versus impingement.  Given packet of AAOS at home stretching exercises.  If minimal improvement consider physical therapy.          Silverio Castillo is a 34 year old, presenting for the following health issues:  Irregular Heart Beat, LAB REQUEST, and Shoulder Pain    HPI    Here for follow-up.  Patient was seen last month for evaluation of tachycardia.  Patient describes symptoms daily and come and go on their own.  Does not seem to be exertionally related.  Has tried to avoid caffeine best as able but has not noted significant change in symptoms.  Had Zio patch placed which was largely unrevealing.  Labs did show a normocytic anemia.  She was started on iron and has noticed improvement in her dizziness and lightheadedness symptoms.  She would like to consider me with cardiology as she continues to have significant palpitations with heart rate running in the low 100s to 1 teens.    She would also like her labs rechecked follow-up on her iron and anemia.  Has been tolerating iron supplements regularly.    Has been having left shoulder pain for the last 2 months.  Began after trying to get back into exercising.  Pain is 3  out of 10 on pain severity scale.  Has not been taking anything for symptomatic management.  No significant limited overhead range of motion.  No numbness or tingling, radiation of pain, weakness.      PAST MEDICAL HISTORY:   Past Medical History:   Diagnosis Date    Sinus tachycardia 2012    occurred with dehydration during pregnancy. Normal EKG since then       PAST SURGICAL HISTORY:   Past Surgical History:   Procedure Laterality Date    HC TOOTH EXTRACTION W/FORCEP      LAPAROSCOPIC SALPINGECTOMY Bilateral 2018    Procedure: LAPAROSCOPIC SALPINGECTOMY;  Laparoscopic Bilateral Salpingectomy;  Surgeon: Surekha Pastor MD;  Location:  OR       FAMILY HISTORY:   Family History   Adopted: Yes   Problem Relation Age of Onset    No Known Problems Mother     No Known Problems Father     No Known Problems Brother        SOCIAL HISTORY:   Social History     Tobacco Use    Smoking status: Former     Current packs/day: 0.00     Average packs/day: 0.2 packs/day for 1 year (0.3 ttl pk-yrs)     Types: Cigarettes     Start date: 2008     Quit date: 2008     Years since quittin.3    Smokeless tobacco: Never   Substance Use Topics    Alcohol use: Not Currently     Comment: occ      No Known Allergies  Current Outpatient Medications   Medication Sig Dispense Refill    triamcinolone (ARISTOCORT HP) 0.5 % external cream Apply topically 2 times daily. 454 g 2     No current facility-administered medications for this visit.           Objective    /72   Pulse 72   Temp 97.9  F (36.6  C) (Tympanic)   Resp 18   Wt 60.6 kg (133 lb 8 oz)   LMP 2025 (Approximate)   SpO2 97%   BMI 27.66 kg/m    Body mass index is 27.66 kg/m .  Physical Exam   General: Pleasant, in no apparent distress.  Eyes: Sclera are white and conjunctiva are clear bilaterally. Lacrimal apparatus free of erythema, edema, and discharge bilaterally.  Ears: External ears without erythema or edema.   Nose:  External nose is symmetrical and free of lesions and deformities.   Cardiovascular: Regular rate and rhythm with S1 equal to S2. No murmurs, friction rubs, or gallops.   Respiratory: Lungs are resonant and clear to auscultation bilaterally. No wheezes, crackles, or rhonchi.  Musculoskeletal: Tenderness palpation over AC joint area and to anterior shoulder.  No limited overhead range of motion bilaterally.  No limitations or cross body posterior range of motion of bilaterally.  Mildly positive empty can and Easley sign on the left.  Negative drop arm test bilaterally.  Skin: No jaundice, pallor, rashes, or lesions.  Psych: Appropriate mood and affect.      Signed Electronically by: Kimmie Allison PA-C

## 2025-05-06 ENCOUNTER — OFFICE VISIT (OUTPATIENT)
Dept: FAMILY MEDICINE | Facility: OTHER | Age: 35
End: 2025-05-06
Attending: PHYSICIAN ASSISTANT
Payer: COMMERCIAL

## 2025-05-06 VITALS
WEIGHT: 133.5 LBS | RESPIRATION RATE: 18 BRPM | BODY MASS INDEX: 27.66 KG/M2 | TEMPERATURE: 97.9 F | SYSTOLIC BLOOD PRESSURE: 124 MMHG | OXYGEN SATURATION: 97 % | DIASTOLIC BLOOD PRESSURE: 72 MMHG | HEART RATE: 72 BPM

## 2025-05-06 DIAGNOSIS — R00.0 TACHYCARDIA: Primary | ICD-10-CM

## 2025-05-06 DIAGNOSIS — D64.9 NORMOCYTIC ANEMIA: ICD-10-CM

## 2025-05-06 DIAGNOSIS — L30.9 ECZEMA, UNSPECIFIED TYPE: ICD-10-CM

## 2025-05-06 DIAGNOSIS — M25.512 ACUTE PAIN OF LEFT SHOULDER: ICD-10-CM

## 2025-05-06 LAB
BASOPHILS # BLD AUTO: 0.1 10E3/UL (ref 0–0.2)
BASOPHILS NFR BLD AUTO: 1 %
EOSINOPHIL # BLD AUTO: 0.1 10E3/UL (ref 0–0.7)
EOSINOPHIL NFR BLD AUTO: 1 %
ERYTHROCYTE [DISTWIDTH] IN BLOOD BY AUTOMATED COUNT: 17.1 % (ref 10–15)
FERRITIN SERPL-MCNC: 52 NG/ML (ref 6–175)
HCT VFR BLD AUTO: 35.8 % (ref 35–47)
HGB BLD-MCNC: 12 G/DL (ref 11.7–15.7)
IMM GRANULOCYTES # BLD: 0 10E3/UL
IMM GRANULOCYTES NFR BLD: 0 %
IRON BINDING CAPACITY (ROCHE): 311 UG/DL (ref 240–430)
IRON SATN MFR SERPL: 19 % (ref 15–46)
IRON SERPL-MCNC: 59 UG/DL (ref 37–145)
LYMPHOCYTES # BLD AUTO: 1.8 10E3/UL (ref 0.8–5.3)
LYMPHOCYTES NFR BLD AUTO: 25 %
MCH RBC QN AUTO: 30.7 PG (ref 26.5–33)
MCHC RBC AUTO-ENTMCNC: 33.5 G/DL (ref 31.5–36.5)
MCV RBC AUTO: 92 FL (ref 78–100)
MONOCYTES # BLD AUTO: 0.4 10E3/UL (ref 0–1.3)
MONOCYTES NFR BLD AUTO: 6 %
NEUTROPHILS # BLD AUTO: 4.8 10E3/UL (ref 1.6–8.3)
NEUTROPHILS NFR BLD AUTO: 67 %
NRBC # BLD AUTO: 0 10E3/UL
NRBC BLD AUTO-RTO: 0 /100
PLATELET # BLD AUTO: 355 10E3/UL (ref 150–450)
RBC # BLD AUTO: 3.91 10E6/UL (ref 3.8–5.2)
WBC # BLD AUTO: 7.2 10E3/UL (ref 4–11)

## 2025-05-06 PROCEDURE — G0463 HOSPITAL OUTPT CLINIC VISIT: HCPCS

## 2025-05-06 PROCEDURE — 85004 AUTOMATED DIFF WBC COUNT: CPT | Mod: ZL | Performed by: PHYSICIAN ASSISTANT

## 2025-05-06 PROCEDURE — 36415 COLL VENOUS BLD VENIPUNCTURE: CPT | Mod: ZL | Performed by: PHYSICIAN ASSISTANT

## 2025-05-06 PROCEDURE — 82728 ASSAY OF FERRITIN: CPT | Mod: ZL | Performed by: PHYSICIAN ASSISTANT

## 2025-05-06 PROCEDURE — 83550 IRON BINDING TEST: CPT | Mod: ZL | Performed by: PHYSICIAN ASSISTANT

## 2025-05-06 RX ORDER — TRIAMCINOLONE ACETONIDE 5 MG/G
CREAM TOPICAL 2 TIMES DAILY
Qty: 454 G | Refills: 2 | Status: SHIPPED | OUTPATIENT
Start: 2025-05-06

## 2025-05-06 ASSESSMENT — PAIN SCALES - GENERAL: PAINLEVEL_OUTOF10: MILD PAIN (3)

## 2025-05-06 NOTE — NURSING NOTE
"Chief Complaint   Patient presents with    Irregular Heart Beat    LAB REQUEST    Shoulder Pain       Initial /72   Pulse 72   Temp 97.9  F (36.6  C) (Tympanic)   Resp 18   Wt 60.6 kg (133 lb 8 oz)   LMP 04/24/2025 (Approximate)   SpO2 97%   BMI 27.66 kg/m   Estimated body mass index is 27.66 kg/m  as calculated from the following:    Height as of 7/14/23: 1.48 m (4' 10.25\").    Weight as of this encounter: 60.6 kg (133 lb 8 oz).  Medication Review: complete    The next two questions are to help us understand your food security.  If you are feeling you need any assistance in this area, we have resources available to support you today.           No data to display                  Health Care Directive:  Patient does not have a Health Care Directive: Discussed advance care planning with patient; however, patient declined at this time.    Nathalia Thomas LPN      "

## 2025-05-07 NOTE — PROGRESS NOTES
"Service Date: 2025    Kimmie Allison PA  [address] [place] [address]     RE:  Marita Stafford   MRN:  [id number]   :  1990       Dear Ms. Leblanc:    It was a pleasure participating in the care of your patient, Marita Stafford .  As you know, she is a 35 year old year old person who I met over a virtual visit today for dizziness, blood pressure control, lipids.    Past medical history is significant for the followin.  Atopic dermatitis  2.  Bilateral salpingectomy  3.  Eczema  4.  Anemia  5.  Left shoulder issues    Patient does not have a documented history of cardiac disease    She saw Dr. Tierney on 2025 complaining of dizziness when she bends down to pick something up off the ground.  It felt like she was spinning.  She then saw you on 2025 and a Zio patch monitor and cardiology referral was ordered.  After starting iron this type of dizziness resolved    Zio patch monitor  through 2025 revealed the following:    The patient triggered the device 13 times and  noted 5 diary entries.  The 100% of these occurrences corresponded to normal sinus rhythm/sinus tachycardia.  Rare supraventricular/ventricular ectopy was noted of less than 1% burden respectively, asymptomatic.  No sustained malignant arrhythmias identified.  Average heart rate 95 bpm    Patient was referred here for further evaluation and treatment.    From a clinical standpoint, for the past few months the patient has been experiencing increased resting heart rate that makes her feel exhausted, tired and \"sleepy\".  Typically she is able to walk an unlimited distance on flat ground and can also climb up and down the stairs without a problem.  She does complain of intermittent randomly occurring chest discomfort as well.    Her sleeping habits are that she wakes up at 5 AM and goes to bed around 11 or 1 AM.  She says she does not snore.  Her food and water intake are scattered and " "not regular.  She claims does not have any significant food intolerances.    The patient otherwise denies gross shortness of breath, PND, orthopnea, edema, syncope or near syncope.    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  Triamcinolone cream    PHYSICAL EXAM:    Blood pressures currently running 124/72, pulse 72, weight 133 pounds  General:  Patient appears comfortable, well groomed  Psych:  Patient is alert and oriented X 3  Eyes:  No gross erythema, exudate  Respiratory:  Patient is breathing comfortably without gross cough    The remainder of the comprehensive physical exam was deferred secondary to the COVID-19 pandemic and secondary to virtual visit restrictions.    LABS:    4/2/2025: Potassium 3.9, GFR normal, TSH normal    5/6/2025: Hemoglobin normal    7/14/2023:     EKG 4/2/2025: Normal sinus rhythm at a rate of 73 bpm, no ST changes    Zio patch monitor 4/2 through 4/11/2025 revealed the following:    The patient triggered the device 13 times and  noted 5 diary entries.  The 100% of these occurrences corresponded to normal sinus rhythm/sinus tachycardia.  Rare supraventricular/ventricular ectopy was noted of less than 1% burden respectively, asymptomatic.  No sustained malignant arrhythmias identified.  Average heart rate 95 bpm    IMPRESSION:    Anna is a 35-year-old lady whose past medical history significant for eczema/atopic dermatitis and anemia who has several active issues:    1.  \"Dizziness\"    From a clinical standpoint, she feels that her resting heart rate is high and it makes her feel exhausted tired and " "\"sleepy\".  These episodes were captured on Zio patch monitor 4/2 through 4/11/2025 which corresponded to normal sinus rhythm or sinus tachycardia.  No sustained ligament arrhythmias were identified, average heart rate overall was 95 bpm.    Empiric trial of lifestyle modification would be warranted.    2.  Blood pressure control    Blood pressures currently running 124/72 mmHg, continue to follow    3.  Lipids    Last LDL 7/14/2023 was 104, she is due for fasting lipid profile    4. Sleep    By clinical history, it is unclear whether or not she is actually falling asleep when she says she feels \"sleepy\", and/or if narcolepsy could be a possibility in the differential diagnosis.  Interestingly however, she says that she will not feel sleepy unless her heart rate is high which may mean that she actually feels lightheaded but the actual clinical indication is not entirely clear.    PLAN:    1.  Echocardiogram to rule out significant structural pathology    2.  Coronary CTA in order to rule out significant underlying structural pathology as a cause for symptoms of chest pain    3.  Abstain from caffeine and alcohol    4.  Electrolyte supplements that are high in sodium, and supplement sodium and aggressive hydration until urine clear    5.  Compression shorts to improve venous return    6.  If these measures are all accomplished and her symptoms persist can consider workup for food intolerances/allergies that could contribute to more persistent peripheral vasodilation as well.      We did discuss whether or not to start a beta-blocker today, however she declined.  We can always consider further referral onto our '\"dizzy\" clinic at the [facility] if needed in the future as well    7.  Further updates to follow pending above test results    Once again, it was a pleasure participating in the care of your patient, Marita Mari Yocasta Stafford .  Please feel free to contact me at any time if there are any questions " "regarding her care in the future.      Sincerely,          Michele Estrada M.D.  Cardiovascular Division  [facility]      The patient has been notified of following:     Telemedicine Visit: The patient's condition can be safely assessed and treated via synchronous audio and visual telemedicine encounter.      Consent:  The patient/guardian has verbally consented to: the potential risks and benefits of telemedicine (video visit) versus in person care; bill my insurance or make self-payment for services provided; and responsibility for payment of non-covered services.     \"This video visit will be conducted via a call between you and your physician/provider. We have found that certain health care needs can be provided without the need for an in-person physical exam.  This service lets us provide the care you need with a video conversation.  If a prescription is necessary we can send it directly to your pharmacy.  If lab work is needed we can place an order for that and you can then stop by our lab to have the test done at a later time.    Video visits are billed at different rates depending on your insurance coverage.  Please reach out to your insurance provider with any questions.    If during the course of the call the physician/provider feels a video visit is not appropriate, you will not be charged for this service.\"    Patient has given verbal consent for video visit? Yes    How would you like to obtain your AVS? Mail    Video-Visit Details    Type of service:  Video Visit    Video Start Time:1030am    Video End Time:1105am    Total visit time including video visit, chart review, charting, coordination of care =68min    Originating Location (pt. Location):patient home      Distant Location (provider location):   Off site home office    Platform used for Video Visit: [employer]    BRE#: [id number]         "

## 2025-05-08 ENCOUNTER — VIRTUAL VISIT (OUTPATIENT)
Dept: CARDIOLOGY | Facility: OTHER | Age: 35
End: 2025-05-08
Attending: INTERNAL MEDICINE
Payer: COMMERCIAL

## 2025-05-08 VITALS — HEIGHT: 59 IN | OXYGEN SATURATION: 98 % | BODY MASS INDEX: 26.21 KG/M2 | HEART RATE: 85 BPM | WEIGHT: 130 LBS

## 2025-05-08 DIAGNOSIS — R07.9 CHEST PAIN, UNSPECIFIED TYPE: Primary | ICD-10-CM

## 2025-05-08 DIAGNOSIS — R00.0 TACHYCARDIA: ICD-10-CM

## 2025-05-08 ASSESSMENT — PAIN SCALES - GENERAL: PAINLEVEL_OUTOF10: NO PAIN (0)

## 2025-05-08 NOTE — NURSING NOTE
Current patient location: [address] [place] [id number]    Is the patient currently in the state of [place]? YES    Visit mode: VIDEO    If the visit is dropped, the patient can be reconnected by:VIDEO VISIT: Text to cell phone:   Telephone Information:   Mobile [phone]       Will anyone else be joining the visit? NO  (If patient encounters technical issues they should call [phone]    Are changes needed to the allergy or medication list? Pt stated no med changes    Are refills needed on medications prescribed by this physician? NO    Rooming Documentation:  Questionnaire(s) completed    Reason for visit: Consult    No other vitals to report per pt    Scarlet GELLERF

## 2025-05-08 NOTE — PATIENT INSTRUCTIONS
1.  Echocardiogram to rule out significant structural pathology    2.  Coronary CTA in order to rule out significant underlying structural pathology as a cause for symptoms of chest pain    3.  Abstain from caffeine and alcohol    4.  Electrolyte supplements that are high in sodium, and supplement sodium and aggressive hydration until urine clear    5.  Compression shorts to improve venous return

## 2025-05-08 NOTE — NURSING NOTE
Rivas, MD Lambert Cardenas, MARISSA York,    Another FYI about orders:    1.  Echo at [facility]    2.  Cor CTA at [facility]  (very non-urgent, can be at complete patient convenience)      Appreciate your help facilitating,    Thanks!    ----------------------------------------------    [facility] notified to call [facility] to get her Coronary CTA scheduled.  Radiology Scheduling at [facility] notified to schedule her echocardiogram.  Sarah Verdin RN......May 8, 2025...12:09 PM

## 2025-05-15 ENCOUNTER — RESULTS FOLLOW-UP (OUTPATIENT)
Dept: CARDIOLOGY | Facility: CLINIC | Age: 35
End: 2025-05-15

## 2025-05-15 ENCOUNTER — HOSPITAL ENCOUNTER (OUTPATIENT)
Dept: CARDIOLOGY | Facility: OTHER | Age: 35
End: 2025-05-15
Attending: INTERNAL MEDICINE
Payer: COMMERCIAL

## 2025-05-15 DIAGNOSIS — R07.9 CHEST PAIN, UNSPECIFIED TYPE: ICD-10-CM

## 2025-05-15 LAB — LVEF ECHO: NORMAL

## 2025-05-15 PROCEDURE — 93306 TTE W/DOPPLER COMPLETE: CPT

## 2025-06-17 ENCOUNTER — RESULTS FOLLOW-UP (OUTPATIENT)
Dept: CARDIOLOGY | Facility: OTHER | Age: 35
End: 2025-06-17

## 2025-06-17 ENCOUNTER — HOSPITAL ENCOUNTER (OUTPATIENT)
Dept: CT IMAGING | Facility: CLINIC | Age: 35
Discharge: HOME | End: 2025-06-17
Attending: INTERNAL MEDICINE
Payer: COMMERCIAL

## 2025-06-17 VITALS — SYSTOLIC BLOOD PRESSURE: 91 MMHG | DIASTOLIC BLOOD PRESSURE: 55 MMHG

## 2025-06-17 DIAGNOSIS — R07.9 CHEST PAIN, UNSPECIFIED TYPE: ICD-10-CM

## 2025-06-17 LAB
BSA FOR ECHO PROCEDURE: 1.54 M2
CCTA ASCENDING AORTA: 2.5
CCTA SINUS: 2.7

## 2025-06-17 PROCEDURE — 75574 CT ANGIO HRT W/3D IMAGE: CPT

## 2025-06-17 PROCEDURE — 250N000011 HC RX IP 250 OP 636: Performed by: INTERNAL MEDICINE

## 2025-06-17 PROCEDURE — 75574 CT ANGIO HRT W/3D IMAGE: CPT | Mod: 26 | Performed by: GENERAL ACUTE CARE HOSPITAL

## 2025-06-17 PROCEDURE — 250N000013 HC RX MED GY IP 250 OP 250 PS 637: Performed by: INTERNAL MEDICINE

## 2025-06-17 PROCEDURE — 250N000009 HC RX 250: Performed by: INTERNAL MEDICINE

## 2025-06-17 RX ORDER — NITROGLYCERIN 0.4 MG/1
0.4 TABLET SUBLINGUAL
Status: DISCONTINUED | OUTPATIENT
Start: 2025-06-17 | End: 2025-06-18 | Stop reason: HOSPADM

## 2025-06-17 RX ORDER — IOPAMIDOL 755 MG/ML
100 INJECTION, SOLUTION INTRAVASCULAR ONCE
Status: COMPLETED | OUTPATIENT
Start: 2025-06-17 | End: 2025-06-17

## 2025-06-17 RX ORDER — METOPROLOL TARTRATE 1 MG/ML
5-20 INJECTION, SOLUTION INTRAVENOUS
Status: DISCONTINUED | OUTPATIENT
Start: 2025-06-17 | End: 2025-06-18 | Stop reason: HOSPADM

## 2025-06-17 RX ORDER — DILTIAZEM HYDROCHLORIDE 5 MG/ML
10-15 INJECTION INTRAVENOUS
Status: DISCONTINUED | OUTPATIENT
Start: 2025-06-17 | End: 2025-06-18 | Stop reason: HOSPADM

## 2025-06-17 RX ADMIN — IOPAMIDOL 100 ML: 755 INJECTION, SOLUTION INTRAVENOUS at 13:53

## 2025-06-17 RX ADMIN — METOPROLOL TARTRATE 5 MG: 5 INJECTION INTRAVENOUS at 13:53

## 2025-06-17 RX ADMIN — NITROGLYCERIN 0.4 MG: 0.4 TABLET SUBLINGUAL at 13:51

## (undated) DEVICE — Device

## (undated) DEVICE — PANTIES MESH LG/XLG 2PK 706M2

## (undated) DEVICE — SU MONOCRYL 4-0 PS-2 18" UND Y496G

## (undated) DEVICE — LINEN TOWEL PACK X5 5464

## (undated) DEVICE — PAD PERI INDIV WRAP 11" 2022A

## (undated) DEVICE — SOL NACL 0.9% IRRIG 1000ML BOTTLE 2F7124

## (undated) DEVICE — PAD CHUX UNDERPAD 30X36" P3036C

## (undated) DEVICE — ENDO TROCAR FIRST ENTRY KII FIOS ADV FIX 05X100MM CFF03

## (undated) DEVICE — GLOVE PROTEXIS W/NEU-THERA 6.5  2D73TE65

## (undated) DEVICE — TUBING INSUFFLATION W/FILTER 10FT GS1016

## (undated) DEVICE — CATH TRAY FOLEY SURESTEP 16FR WDRAIN BAG STLK LATEX A300316A

## (undated) DEVICE — LINEN GOWN X4 5410

## (undated) DEVICE — ESU GROUND PAD UNIVERSAL W/O CORD

## (undated) DEVICE — STRAP KNEE/BODY 31143004

## (undated) DEVICE — ENDO TROCAR SLEEVE KII ADV FIXATION 05X100MM CFS02

## (undated) DEVICE — ESU LIGASURE LAPAROSCOPIC BLUNT TIP SEALER 5MMX37CM LF1837

## (undated) DEVICE — GLOVE PROTEXIS BLUE W/NEU-THERA 7.0  2D73EB70

## (undated) DEVICE — SU DERMABOND ADVANCED .7ML DNX12

## (undated) DEVICE — SOL WATER IRRIG 1000ML BOTTLE 2F7114

## (undated) RX ORDER — FENTANYL CITRATE 50 UG/ML
INJECTION, SOLUTION INTRAMUSCULAR; INTRAVENOUS
Status: DISPENSED
Start: 2018-09-14

## (undated) RX ORDER — EPHEDRINE SULFATE 50 MG/ML
INJECTION, SOLUTION INTRAMUSCULAR; INTRAVENOUS; SUBCUTANEOUS
Status: DISPENSED
Start: 2018-09-14

## (undated) RX ORDER — TRIAMCINOLONE ACETONIDE 40 MG/ML
INJECTION, SUSPENSION INTRA-ARTICULAR; INTRAMUSCULAR
Status: DISPENSED
Start: 2022-10-11

## (undated) RX ORDER — ONDANSETRON 2 MG/ML
INJECTION INTRAMUSCULAR; INTRAVENOUS
Status: DISPENSED
Start: 2018-09-14

## (undated) RX ORDER — OXYCODONE HYDROCHLORIDE 5 MG/1
TABLET ORAL
Status: DISPENSED
Start: 2018-09-14

## (undated) RX ORDER — DEXAMETHASONE SODIUM PHOSPHATE 4 MG/ML
INJECTION, SOLUTION INTRA-ARTICULAR; INTRALESIONAL; INTRAMUSCULAR; INTRAVENOUS; SOFT TISSUE
Status: DISPENSED
Start: 2018-09-14

## (undated) RX ORDER — LIDOCAINE HYDROCHLORIDE 20 MG/ML
INJECTION, SOLUTION EPIDURAL; INFILTRATION; INTRACAUDAL; PERINEURAL
Status: DISPENSED
Start: 2018-09-14

## (undated) RX ORDER — PHENYLEPHRINE HCL IN 0.9% NACL 1 MG/10 ML
SYRINGE (ML) INTRAVENOUS
Status: DISPENSED
Start: 2018-09-14

## (undated) RX ORDER — KETOROLAC TROMETHAMINE 30 MG/ML
INJECTION, SOLUTION INTRAMUSCULAR; INTRAVENOUS
Status: DISPENSED
Start: 2018-09-14

## (undated) RX ORDER — HYDROMORPHONE HYDROCHLORIDE 1 MG/ML
INJECTION, SOLUTION INTRAMUSCULAR; INTRAVENOUS; SUBCUTANEOUS
Status: DISPENSED
Start: 2018-09-14

## (undated) RX ORDER — TRIAMCINOLONE ACETONIDE 40 MG/ML
INJECTION, SUSPENSION INTRA-ARTICULAR; INTRAMUSCULAR
Status: DISPENSED
Start: 2022-07-27